# Patient Record
Sex: MALE | Race: WHITE
[De-identification: names, ages, dates, MRNs, and addresses within clinical notes are randomized per-mention and may not be internally consistent; named-entity substitution may affect disease eponyms.]

---

## 2017-04-06 ENCOUNTER — HOSPITAL ENCOUNTER (OUTPATIENT)
Dept: HOSPITAL 75 - ER | Age: 32
Discharge: HOME | End: 2017-04-06
Attending: SURGERY
Payer: COMMERCIAL

## 2017-04-06 VITALS — DIASTOLIC BLOOD PRESSURE: 83 MMHG | SYSTOLIC BLOOD PRESSURE: 134 MMHG

## 2017-04-06 VITALS — SYSTOLIC BLOOD PRESSURE: 107 MMHG | DIASTOLIC BLOOD PRESSURE: 64 MMHG

## 2017-04-06 VITALS — DIASTOLIC BLOOD PRESSURE: 78 MMHG | SYSTOLIC BLOOD PRESSURE: 119 MMHG

## 2017-04-06 VITALS — WEIGHT: 195.31 LBS | HEIGHT: 75 IN | BODY MASS INDEX: 24.28 KG/M2

## 2017-04-06 VITALS — DIASTOLIC BLOOD PRESSURE: 85 MMHG | SYSTOLIC BLOOD PRESSURE: 129 MMHG

## 2017-04-06 DIAGNOSIS — K21.0: Primary | ICD-10-CM

## 2017-04-06 DIAGNOSIS — K29.70: ICD-10-CM

## 2017-04-06 LAB
ALBUMIN SERPL-MCNC: 4.7 G/DL (ref 3.2–4.5)
ALT SERPL-CCNC: 21 U/L (ref 0–55)
ANION GAP SERPL CALC-SCNC: 12 MMOL/L (ref 5–14)
AST SERPL-CCNC: 23 U/L (ref 5–34)
BASOPHILS # BLD AUTO: 0.1 10^3/UL (ref 0–0.1)
BASOPHILS NFR BLD AUTO: 0 % (ref 0–10)
BILIRUB SERPL-MCNC: 1.4 MG/DL (ref 0.1–1)
BUN SERPL-MCNC: 14 MG/DL (ref 7–18)
BUN/CREAT SERPL: 13
CALCIUM SERPL-MCNC: 9.4 MG/DL (ref 8.5–10.1)
CHLORIDE SERPL-SCNC: 109 MMOL/L (ref 98–107)
CO2 SERPL-SCNC: 23 MMOL/L (ref 21–32)
CREAT SERPL-MCNC: 1.05 MG/DL (ref 0.6–1.3)
EOSINOPHIL # BLD AUTO: 0.1 10^3/UL (ref 0–0.3)
EOSINOPHIL NFR BLD AUTO: 1 % (ref 0–10)
ERYTHROCYTE [DISTWIDTH] IN BLOOD BY AUTOMATED COUNT: 12.8 % (ref 10–14.5)
GFR SERPLBLD BASED ON 1.73 SQ M-ARVRAT: > 60 ML/MIN
GLUCOSE SERPL-MCNC: 97 MG/DL (ref 70–105)
LYMPHOCYTES # BLD AUTO: 1.8 X 10^3 (ref 1–4)
LYMPHOCYTES NFR BLD AUTO: 14 % (ref 12–44)
MCH RBC QN AUTO: 32 PG (ref 25–34)
MCHC RBC AUTO-ENTMCNC: 35 G/DL (ref 32–36)
MCV RBC AUTO: 92 FL (ref 80–99)
MONOCYTES # BLD AUTO: 1.2 X 10^3 (ref 0–1)
MONOCYTES NFR BLD AUTO: 9 % (ref 0–12)
NEUTROPHILS # BLD AUTO: 9.7 X 10^3 (ref 1.8–7.8)
NEUTROPHILS NFR BLD AUTO: 76 % (ref 42–75)
PLATELET # BLD: 297 10^3/UL (ref 130–400)
PMV BLD AUTO: 10.1 FL (ref 7.4–10.4)
POTASSIUM SERPL-SCNC: 3.7 MMOL/L (ref 3.6–5)
PROT SERPL-MCNC: 7.5 G/DL (ref 6.4–8.2)
RBC # BLD AUTO: 4.53 10^6/UL (ref 4.35–5.85)
SODIUM SERPL-SCNC: 144 MMOL/L (ref 135–145)
WBC # BLD AUTO: 12.7 10^3/UL (ref 4.3–11)

## 2017-04-06 PROCEDURE — 71020: CPT

## 2017-04-06 PROCEDURE — 96375 TX/PRO/DX INJ NEW DRUG ADDON: CPT

## 2017-04-06 PROCEDURE — 85025 COMPLETE CBC W/AUTO DIFF WBC: CPT

## 2017-04-06 PROCEDURE — 88305 TISSUE EXAM BY PATHOLOGIST: CPT

## 2017-04-06 PROCEDURE — 99211 OFF/OP EST MAY X REQ PHY/QHP: CPT

## 2017-04-06 PROCEDURE — 36415 COLL VENOUS BLD VENIPUNCTURE: CPT

## 2017-04-06 PROCEDURE — 96374 THER/PROPH/DIAG INJ IV PUSH: CPT

## 2017-04-06 PROCEDURE — 80053 COMPREHEN METABOLIC PANEL: CPT

## 2017-04-06 RX ADMIN — LORAZEPAM SCH MG: 2 INJECTION INTRAMUSCULAR; INTRAVENOUS at 13:30

## 2017-04-06 RX ADMIN — MIDAZOLAM HYDROCHLORIDE PRN MG: 1 INJECTION, SOLUTION INTRAMUSCULAR; INTRAVENOUS at 10:25

## 2017-04-06 RX ADMIN — MIDAZOLAM HYDROCHLORIDE PRN MG: 1 INJECTION, SOLUTION INTRAMUSCULAR; INTRAVENOUS at 10:33

## 2017-04-06 RX ADMIN — MIDAZOLAM HYDROCHLORIDE PRN MG: 1 INJECTION, SOLUTION INTRAMUSCULAR; INTRAVENOUS at 10:28

## 2017-04-06 RX ADMIN — LORAZEPAM SCH MG: 2 INJECTION INTRAMUSCULAR; INTRAVENOUS at 05:19

## 2017-04-06 RX ADMIN — DEXTROSE MONOHYDRATE AND SODIUM CHLORIDE SCH MLS/HR: 5; .45 INJECTION, SOLUTION INTRAVENOUS at 02:38

## 2017-04-06 RX ADMIN — METOCLOPRAMIDE SCH MG: 5 INJECTION, SOLUTION INTRAMUSCULAR; INTRAVENOUS at 12:30

## 2017-04-06 RX ADMIN — LORAZEPAM SCH MG: 2 INJECTION INTRAMUSCULAR; INTRAVENOUS at 08:37

## 2017-04-06 RX ADMIN — FENTANYL CITRATE PRN MCG: 50 INJECTION, SOLUTION INTRAMUSCULAR; INTRAVENOUS at 10:30

## 2017-04-06 RX ADMIN — FENTANYL CITRATE PRN MCG: 50 INJECTION, SOLUTION INTRAMUSCULAR; INTRAVENOUS at 10:23

## 2017-04-06 RX ADMIN — MIDAZOLAM HYDROCHLORIDE PRN MG: 1 INJECTION, SOLUTION INTRAMUSCULAR; INTRAVENOUS at 10:35

## 2017-04-06 RX ADMIN — METOCLOPRAMIDE SCH MG: 5 INJECTION, SOLUTION INTRAMUSCULAR; INTRAVENOUS at 08:37

## 2017-04-06 RX ADMIN — DEXTROSE MONOHYDRATE AND SODIUM CHLORIDE SCH MLS/HR: 5; .45 INJECTION, SOLUTION INTRAVENOUS at 03:40

## 2017-04-06 NOTE — CONSCIOUS SEDATION/ASA
Conscious Sedation Pre-Proced


Time Reviewed:  10:00


ASA Class:  2











Airway Mallampati Classification: ([place] appropriate class) I.  II.  III,  IV


 


Lungs 


 


Heart 


 


 ASA score


 


 ASA 1: a normal healthy patient


 


 ASA 2:  a patient with a mild systemic disease (mid diabetes, controlled 

hypertension, obesity 


 


 ASA 3:  a patient with a severe systemic disease that limits activity  (angina

, COPD, prior Myocardial infarction)


 


 ASA 4:  a patient with an incapacitating disease that is a constant threat to 

life (CHF, renal failure)


 


 ASA 5:  a moribund patient not expected to survive 24 hrs.  (ruptured aneurysm)


 


 ASA 6:  a declared brain dead patient whose organs are being harvested.


 


 For emergent operations, add the letter E after the classification








Grade 2


Sedation Plan:  Analgesia, Amnesia, Plan communicated to team members, 

Discussed options with patient/fam, Discussed risks with patient/fam


Note


The patient is an appropriate candidate to undergo the planned procedure, 

sedation, and anesthesia.





The patient immediately re-assessed prior to indication.











PEDRO BLACK MD Apr 6, 2017 10:11 am

## 2017-04-06 NOTE — ED GI
General


Chief Complaint:  Foreign Body


Stated Complaint:  ESOPHAGEAL OBSTRUCTION


Nursing Triage Note:  


PT STATES HAS PIECE OF STEAK CAUGHT IN THROAT FOR APPROX 3 HOURS


Sepsis Screen:  No Definite Risk


Source of Information:  Patient





History of Present Illness


Time Seen By Provider:  00:33


Initial Comments


PT STATES HE WAS EATING STEAK TONIGHT AND IT HAS BECOME STUCK AND NOW CANNOT 

EVEN SWALLOW SALIVA--COMES BACK UP


STATES IT OCCURRED 3 HOURS AGO


HE STATES IT HAS HAPPENED MULTIPLE TIMES OVER THE LAST COUPLE OF YEARS, BUT HAS 

ALWAYS BEEN ABLE TO GET IT UNSTUCK, AND HAS NEVER SOUGHT CARE AT ANY TIME FOR 

THIS PROBLEM


C/O MUCH PRESSURE IN CHEST--"FEELS LIKE A BIG GAS BUBBLE" 


NO CHOKING OR DIFFICULTY BREATHING OR WHEEZING.





PCP: DR. CHAUHAN





Allergies and Home Medications


Allergies


Coded Allergies:  


     sulfamethoxazole (Verified  Allergy, Intermediate, RASH, 6/22/14)


     trimethoprim (Verified  Allergy, Intermediate, RASH, 6/22/14)


     Penicillins (Verified  Allergy, Unknown, HIVES, 6/22/14)





Home Medications


Omeprazole 40 Mg Capsule.dr, 40 MG PO DAILY, (Reported)





Review of Systems


Constitutional:  no symptoms reported


Respiratory:  No Symptoms Reported


Cardiovascular:  See HPI


Gastrointestinal:  See HPI


Genitourinary:  No Symptoms Reported


Musculoskeletal:  no symptoms reported


Skin:  no symptoms reported


Psychiatric/Neurological:  No Symptoms Reported


Endocrine:  No Symptoms Reported


Hematologic/Lymphatic:  No Symptoms Reported





Past Medical-Social-Family Hx


Patient Social History


Alcohol Use:  Denies Use


Recreational Drug Use:  No


Smoking Status:  Never a Smoker


Recent Foreign Travel:  No


Contact w/Someone Who Travel:  No


Recent Infectious Disease Expo:  No


Recent Hopitalizations:  No


Physical Abuse Screen:  No


Sexual Abuse:  No





Immunizations Up To Date


Tetanus Booster (TDap):  Less than 5yrs





Seasonal Allergies


Seasonal Allergies:  No





Surgeries


HX Surgeries:  Yes (RIGHT KNEE/LEG FOR FX WHEN YOUNG)


Surgeries:  Orthopedic





Respiratory


Hx Respiratory Disorders:  No





Cardiovascular


Hx Cardiac Disorders:  No





Neurological


Hx Neurological Disorders:  No





Genitourinary


Hx Genitourinary Disorders:  No





Gastrointestinal


Hx Gastrointestinal Disorders:  Yes (SELF-DX OF GERD; ESOPHAGEAL OBSTRUCTIONS)


Gastrointestinal Disorders:  Gastroesophageal Reflux





Musculoskeletal


Hx Musculoskeletal Disorders:  Yes (RIGHT KNEE/LEG FX AND SURGICAL REPAIR AS 

CHILD)





Endocrine


Hx Endocrine Disorders:  No





HEENT


HX ENT Disorders:  No





Cancer


Hx Cancer:  No





Psychosocial


Hx Psychiatric Problems:  No





Integumentary


HX Skin/Integumentary Disorder:  No





Blood Transfusions


Hx Blood Disorders:  No


Adverse Reaction to a Blood Tr:  No





Physical Exam


Vital Signs





 VS - Last 72 Hours, by Label








 4/6/17





 00:40


 


Temp 98.5


 


Pulse 133


 


Resp 18


 


B/P (MAP) 126/88


 


Pulse Ox 96





Capillary Refill : Less Than 3 Seconds


General Appearance:  WD/WN, no apparent distress, other (TALKS WITHOUT 

DIFFICULTY, BUT SPITTING OUT SALIVA)


Respiratory:  chest non-tender, normal breath sounds, no respiratory distress, 

no accessory muscle use


Cardiovascular:  regular rate, rhythm, no edema, no murmur


Gastrointestinal:  normal bowel sounds, non tender, soft, no organomegaly, no 

pulsatile mass


Extremities:  normal inspection


Back:  normal inspection


Neurologic/Psychiatric:  CNs II-XII nml as tested, no motor/sensory deficits, 

alert, normal mood/affect, oriented x 3


Skin:  normal color, warm/dry





Progress/Results/Core Measures


Results/Orders


My Orders





Orders - KATHI PONCE DO


Chest Pa/Lat (2 View) (4/6/17 00:34)


Saline Lock/Iv-Start (4/6/17 00:37)


Glucagon Emergency Kit (Glucagon Emergen (4/6/17 00:45)


Glucagon Emergency Kit (Glucagon Emergen (4/6/17 00:35)


Glucagon Emergency Kit (Glucagon Emergen (4/6/17 01:00)





Medications Given in ED





Current Medications








 Medications  Dose


 Ordered  Sig/Vicki


 Route  Start Time


 Stop Time Status Last Admin


Dose Admin


 


 Glucagon  1 mg  ONCE  ONCE


 IV  4/6/17 00:45


 4/6/17 00:46 DC 4/6/17 00:45


1 MG


 


 Glucagon  1 mg  ONCE  ONCE


 IV  4/6/17 01:00


 4/6/17 01:01 DC 4/6/17 00:58


1 MG








Vital Signs/I&O





Vital Sign - Last 12Hours








 4/6/17





 00:40


 


Temp 98.5


 


Pulse 133


 


Resp 18


 


B/P (MAP) 126/88


 


Pulse Ox 96














Blood Pressure Mean:  100








Progress Note :  


Progress Note


PT DID VOMIT A LARGE AMOUNT OF LIQUID AND SOME FOOD DEBRIS IN XRAY DEPT, PRIOR 

TO GETTING CXR


PT GIVEN AN ADDITIONAL DOSE OF GLUCAGON, THEN TRIED TO DRINK WATER--WITHOUT 

SUCCESS--BEGAN SPITTING UP SALIVA AND WATER AGAIN





Diagnostic Imaging





Comments


CXR--NO ACUTE PROCESS, PENDING RADIOLOGIST REVIEW


   Reviewed:  Reviewed by Me





Departure


Communication


Progress Notes


0110--SPOKE WITH DR. BLACK, ACCEPTS PT FOR ADMIT. ORDERS NOTED.





Impression


Impression:  


 Primary Impression:  


 Esophageal obstruction due to food impaction


Disposition:  09 ADMITTED AS INPATIENT


Condition:  Stable


Decision to Admit Reason:  Admit from ER (General)


Decision to Admit/Date:  Apr 6, 2017


Time/Decision to Admit Time:  01:10





Departure-Patient Inst.


Referrals:  


OZZY CHAUHAN MD (PCP)


Primary Care Physician











KATHI PONCE DO Apr 6, 2017 05:04

## 2017-04-06 NOTE — HISTORY AND PHYSICAL
DATE OF ADMISSION: 

04/06/2017

 

ATTENDING PRIMARY CARE PHYSICIAN: 

Dr. CRISTINO Steen 

 

Mr. Rufino Alberts is a 31-year-old male who presented late last

night with dysphagia and coughing. He reports he has had this

issue for the past 3 years after eating a meal and certain food

boluses however, this time this has persisted longer. He reports

that he has had a history of heartburn and reflux for several

years now and he states that this is usually precipitated by

alcohol. He has been taking omeprazole 40 mg daily; however,

states that he does forget to take it on some occasions and has

heartburn becomes worse. On this episode he had dinner last night

and felt chest pressure sensation followed by a cough that would

not reduce. He does not report any hematemesis, no coffee-ground

emesis. 

 

PAST MEDICAL HISTORY:

1.   Gastroesophageal reflux disease.

2.   Peptic ulcer disease. 

 

PAST SURGERIES:

Left knee arthroscopy. 

 

ALLERGIES:

PENICILLIN 

 

MEDICATIONS:

Omeprazole 40 mg daily. 

 

SOCIAL HISTORY:

Negative smoke. Social alcohol. 

 

FAMILY HISTORY:

Noncontributory. 

 

VITAL SIGNS:   Stable, afebrile. 

 

REVIEW OF SYSTEMS:

This is a well-nourished male, currently in no acute distress. He

is not experiencing shortness of breath or difficulty breathing.

Mild substernal chest pressure sensation as well as hiccups.

Frequent episodes of regurgitation, however, this has slightly

improved over time. No diarrhea, constipation. No red blood per

rectum. No dark tarry stools. No fever or chills. No recent

inadvertent weight loss. 

 

PHYSICAL EXAMINATION:

CHEST: Clear. 

HEART: Regular. 

EXTREMITIES: No lower extremity edema. Negative Homans sign. 

HEENT: No scleral icterus. No cervical lymphadenopathy. 

ABDOMEN: Soft, nontender, nondistended. 

 

ASSESSMENT AND PLAN:

31-year-old male with dysphagia most likely secondary to reflux

esophagitis and esophageal stricture. We will proceed with an

EGD, as well as biopsies and possible balloon dilatation. 

 

 

 

Job ID: [id number] 

Dictated Date: 04/06/2017 08:06:57 

Transcription Date: 04/06/2017 09:08:40/tobias

## 2017-04-06 NOTE — PROGRESS NOTE-PRE OPERATIVE
Pre-Operative Progress Note


H&P Reviewed


The H&P was reviewed, patient examined and no changes noted.


Date H&P Reviewed:  Apr 6, 2017


Time H&P Reviewed:  10:00


Pre-Operative Diagnosis:  dysphagia, GERD











PEDRO BLACK MD Apr 6, 2017 10:12 am

## 2017-04-06 NOTE — DIAGNOSTIC IMAGING REPORT
Clinical indication: Evaluate chest.



Exam: Chest x-ray PA and lateral views.



Comparisons: None.



Findings:



Lungs/pleura: Lungs are clear. There is no pneumothorax. There

is no pleural effusion.



Mediastinum: Unremarkable.



Pulmonary vasculature: Unremarkable.



Heart: Unremarkable.



Bones/extrathoracic soft tissue: Unremarkable.



Impression:

There is no radiographic evidence of acute cardiopulmonary

process.



Dictated by:



Dictated on workstation # [id number]

## 2017-04-07 NOTE — OPERATIVE REPORT
PROCEDURE PHYSICIAN:   PEDRO BLACK

 

DATE OF PROCEDURE:  

04/06/2017

 

ATTENDING PRIMARY CARE PHYSICIAN: 

Dr. Steen. 

 

PREOPERATIVE DIAGNOSES:

1.   Dysphagia. 

2.   Gastroesophageal reflux disease. 

 

POSTOPERATIVE DIAGNOSES:

1.   Severe reflux esophagitis, between class C and D with some

superficial erosions.

2.   No hiatal hernia. 

3.   Moderate severity gastritis of the stomach, as well as the

pylorus. 

 

PROCEDURE:

EGD with biopsy and balloon dilatation of the distal esophagus. 

 

SURGEON:

Dr. Black. 

 

ANESTHESIA:

Conscious sedation. 

 

ESTIMATED BLOOD LOSS:

Minimal. 

 

FINDINGS:

1.   Severe reflux esophagitis, between class C and D with some

superficial erosions. 

2.   No hiatal hernia. 

3.   Moderate severity gastritis throughout the stomach and

pylorus with no formal ulcers or distal obstructions. 

 

DISPOSITION:

The patient tolerated the procedure well. 

 

BRIEF HISTORY:

Mr. Rufino Alberts is a 31-year-old male who presented early this

morning with dysphagia and coughing. He reports that he has had

similar issues to this in the past 2 years after eating a meal or

certain food boluses however, this time this had persisted

longer. He has a history of heartburn and reflux for several

years and has been taking omeprazole 40 mg daily. He reports that

he does drink alcohol on the weekends, which does make his

symptoms worse. After the previous night's dinner he felt chest

pressure sensation follow by a cough that would not reduce. He

did not report any hematemesis, no coffee-ground emesis. 

 

PROCEDURE:

The patient was brought to the endoscopy suite, laid in the left

lateral decubitus position with the head slightly elevated. After

adequate IV pain and sedative medications and conscious sedation

anesthesia, the mouthpiece was applied. 

 

The endoscope was placed in the mouth, visualizing the pharynx

and hypopharyngeal region. Vocal cords, epiglottis and vallecula

identified and appeared to be normal. The endoscope was then

advanced through the first, second, and 3rd portions of the

esophagus.  At the level of the GE junction a severe reflux

esophagitis, between class C and D was identified. There were

some erosions identified in this region as well. There was also

edema of the mucosal layer. Biopsies were taken with forceps with

visualization of good hemostasis. 

 

The endoscope was then advanced in stomach and endoscope

retroflexed visualizing no hiatal hernia. There was a moderate

severity gastritis throughout the stomach, as well as the

pylorus; however, no formal ulcers, polyps or any neoplasms.  The

duodenum appeared normal with no distal obstructions. A biopsy

was taken of the stomach antrum as well. 

 

We then directed our attention to dilatation of the distal

esophagus.  A CRE fixed guidewire balloon was placed into the

stomach and then retracted back to the area of the edema. The

balloon was dilated to 3 atmospheres of pressure then 4.5 and

then 7 atmospheres of pressure, approximately 18 mm.  There is

mild resistance. This was left in place for approximately 60

seconds. The balloon was then desufflated and removed. No mucosal

tears were identified. The endoscope was then slowly withdrawn

while taking a second look and suctioning of residual air with no

additional findings. 

 

The patient tolerated the procedure well. We will have him

continue with medical management with the necessary lifestyle and

diet accommodation including smaller, more frequent meals,

avoidance of eating at night, as well as head elevation while

lying supine. He also needs to avoid alcohol, caffeinated

beverages, as well as spicy, greasy and acidic foods. We will

also start him on Protonix 40 mg daily, as well as Carafate 1

gram q.i.d. for the next 2 weeks then on a p.r.n. basis. 

 

 

 

Job ID: [id number]

Dictated Date: 04/06/2017 20:29:10 

Transcription Date: 04/07/2017 11:03:46 / tobias

## 2017-05-07 NOTE — XMS REPORT
Continuity of Care Document

 Created on: 2017



RONNIE MILNER

External Reference #: [id number]

: 1985

Sex: Male



Demographics







 Address  [address]

[place]  [address]

 

 Home Phone  [phone]

 

 Preferred Language  Unknown

 

 Marital Status  Unknown

 

 Jewish Affiliation  Unknown

 

 Race  Unknown

 

 Ethnic Group  Unknown





Author







 Author  [facility]

 

 Address  Unknown

 

 Phone  Unavailable



                                                      



Allergies

                      





 Active                    Description                    Code                  
  Type                    Severity                    Reaction                  
  Onset                    Reported/Identified                    Relationship 
to Patient                    Clinical Status                

 

 Yes                    Penicillins                                         
Drug Allergy                    N/A                    N/A                     
                    2013                                                 
         

 

 Yes                    sulfamethoxazole                    [id number]         
           Drug Allergy                    Moderate                    RASH    
                                     2017                                
                          

 

 Yes                    trimethoprim                    [id number]             
       Drug Allergy                    Moderate                    RASH        
                                 2017                                    
                      

 

 Yes                    Penicillins                    [id number]              
      Drug Allergy                    Unknown                    HIVES         
                                2017                                     
                     



                                                                               
                                       



Medications

                                                                               
         



Problems

                      





 Date Dx Coded                    Attending                    Type            
        Code                    Diagnosis                    Diagnosed By      
          

 

 2008                                                              311   
                 MO DEPRESSIVE DISORDER NOS                                     

 

 2008                                                              311   
                 MO DEPRESSIVE DISORDER NOS                                     

 

 2008                                                              311   
                 MO DEPRESSIVE DISORDER NOS                                     

 

 2008                                                              311   
                 MO DEPRESSIVE DISORDER NOS                                     

 

 2008                    SHERRY JAIMES                        
                 311                    MO DEPRESSIVE DISORDER NOS             
                        

 

 2008                    SHERRY JAIMES                        
                 311                    MO DEPRESSIVE DISORDER NOS             
                        

 

 2008                    SHERRY JAIMES                        
                 311                    MO DEPRESSIVE DISORDER NOS             
                        

 

 2008                    SHERRY JAIMES                        
                 311                    MO DEPRESSIVE DISORDER NOS             
                        

 

 2008                    CLEMENTINA ZAVALETA                       
                  311                    MO DEPRESSIVE DISORDER NOS            
                         

 

 2008                    SHAHANA MONDRAGON                         
                311                    MO DEPRESSIVE DISORDER NOS              
                       

 

 2008                                                              300.00
                    AN ANXIETY UNSPEC                                     

 

 2008                                                              300.00
                    AN ANXIETY UNSPEC                                     

 

 2008                                                              300.00
                    AN ANXIETY UNSPEC                                     

 

 2008                                                              300.00
                    AN ANXIETY UNSPEC                                     

 

 2008                    SHERRY JAIMES                        
                 300.00                    AN ANXIETY UNSPEC                   
                  

 

 2008                    SHERRY JAIMES                        
                 300.00                    AN ANXIETY UNSPEC                   
                  

 

 2008                    SHERRY JAIMES                        
                 300.00                    AN ANXIETY UNSPEC                   
                  

 

 2008                    SHERRY JAIMES                        
                 300.00                    AN ANXIETY UNSPEC                   
                  

 

 2008                    CLEMENTINA ZAVALETA                       
                  300.00                    AN ANXIETY UNSPEC                  
                   

 

 2008                    SHAHANA MONDRAGON                         
                300.00                    AN ANXIETY UNSPEC                    
                 

 

 2008                                                              V58.69
                    MEDICATION HIGH RISK                                     

 

 2008                                                              V58.69
                    MEDICATION HIGH RISK                                     

 

 2008                                                              V58.69
                    MEDICATION HIGH RISK                                     

 

 2008                                                              V58.69
                    MEDICATION HIGH RISK                                     

 

 2008                    SHERRY JAIMES                        
                 V58.69                    MEDICATION HIGH RISK                
                     

 

 2008                    SHERRY JAIMES                        
                 V58.69                    MEDICATION HIGH RISK                
                     

 

 2008                    SHERRY JAIMES                        
                 V58.69                    MEDICATION HIGH RISK                
                     

 

 2008                    SHERRY JAIMES                        
                 V58.69                    MEDICATION HIGH RISK                
                     

 

 2008                    CLEMENTINA ZAVALETA                       
                  V58.69                    MEDICATION HIGH RISK               
                      

 

 2008                    SHAHANA MONDRAGON                         
                V58.69                    MEDICATION HIGH RISK                 
                    

 

 2013                                                              314.01
                    ADHD COMBINED                                     

 

 2013                                                              314.01
                    ADHD COMBINED                                     

 

 2013                                                              314.01
                    ADHD COMBINED                                     

 

 2013                                                              314.01
                    ADHD COMBINED                                     

 

 2013                    SHERRY JAIMES                        
                 314.01                    ADHD COMBINED                       
              

 

 2013                    SHERRY JAIMES                        
                 314.01                    ADHD COMBINED                       
              

 

 2013                    SHERRY JAIMES                        
                 314.01                    ADHD COMBINED                       
              

 

 2013                    SHERRY JAIMES                        
                 314.01                    ADHD COMBINED                       
              

 

 2013                    CLEMENTINA ZAVALETA R                       
                  314.01                    ADHD COMBINED                      
               

 

 2013                    SHAHANA MONDRAGON                         
                314.01                    ADHD COMBINED                        
             

 

 2014                    CLEMENTINA ZAVALETA R                       
                  300.23                    AN SOCIAL PHOBIA                   
                  

 

 2014                    CLEMENTINA ZAVALETA R                       
                  305.00                    NONDEPENDENT ALCOHOL ABUSE 
UNSPECIFIED DRINKING BEHAVIOR                                     

 

 2014                    CLEMENTINA ZAVALETA R                       
                  461.9                    SINUSITIS ACUTE                     
                

 

 2014                    SHAHANA MONDRAGON                         
                300.23                    AN SOCIAL PHOBIA                     
                

 

 2014                    SHAHANA MONDRAGON                         
                305.00                    NONDEPENDENT ALCOHOL ABUSE 
UNSPECIFIED DRINKING BEHAVIOR                                     

 

 2014                    SHAHANA MONDRAGON                         
                461.9                    SINUSITIS ACUTE                       
              

 

 2014                    ROMY FREDERICK, ALINA ELMORE                    Ot  
                  708.9                    URTICARIA NOS                       
              

 

 2017                    PEDRO BLACK MD, Ot          
          K21.0                    GASTRO-ESOPHAGEAL REFLUX DISEASE WITH ES    
                                 

 

 2017                    PEDRO BLACK MD, Ot          
          K29.70                    GASTRITIS, UNSPECIFIED, WITHOUT BLEEDING   
                                  

 

 2017                    PEDRO BLACK MD, Ot          
          K21.0                    GASTRO-ESOPHAGEAL REFLUX DISEASE WITH ES    
                                 

 

 2017                    PEDRO BLACK MD, Ot          
          K29.70                    GASTRITIS, UNSPECIFIED, WITHOUT BLEEDING   
                                  



                                                                               
                                                                               
                                                                               
                                                                               
                                                                               
                                                                               
                                                                               
                                   



Procedures

                      





 Code                    Description                    Performed By           
         Performed On                

 

                     25030                                                     
     URINE DRUG SCREEN  (IN-HOUSE)                                             
              2013                

 

                     68447                                                     
     PSYCH DIAG EVAL W/MED SRVCS                                               
            2013                

 

                     38598                                                     
     URINE DRUG SCREEN  (IN-HOUSE)                                             
              2013                

 

                     85080                                                     
     URINE DRUG SCREEN  (IN-HOUSE)                                             
              2013                

 

                     21923                                                     
     URINE DRUG SCREEN  (IN-HOUSE)                                             
              10/03/2013                

 

                     23665                                                     
     DRUG CONFIRMATION                                                         
  10/06/2013                

 

                     [id number]                                                     
     STREP A  (IN-HOUSE)                                                       
    2014                



                                                                               
                                                                     



Results

                      





 Test                    Result                    Range                









 Complete blood count (CBC) with automated white blood cell (WBC) differential 
- 17 06:20                









 Blood leukocytes automated count (number/volume)                    12.7 10*3/
uL                    4.3-11.0                

 

 Blood erythrocytes automated count (number/volume)                    4.53 10*6
/uL                    4.35-5.85                

 

 Venous blood hemoglobin measurement (mass/volume)                    14.6 g/dL
                    13.3-17.7                

 

 Blood hematocrit (volume fraction)                    42 %                    
40-54                

 

 Automated erythrocyte mean corpuscular volume                    92 [foz_us]  
                  80-99                

 

 Automated erythrocyte mean corpuscular hemoglobin (mass per erythrocyte)      
              32 pg                    25-34                

 

 Automated erythrocyte mean corpuscular hemoglobin concentration measurement (
mass/volume)                    35 g/dL                    32-36                

 

 Automated erythrocyte distribution width ratio                    12.8 %      
              10.0-14.5                

 

 Automated blood platelet count (count/volume)                    297 10*3/uL  
                  130-400                

 

 Automated blood platelet mean volume measurement                    10.1 [foz_
us]                    7.4-10.4                

 

 Automated blood neutrophils/100 leukocytes                    76 %            
        42-75                

 

 Automated blood lymphocytes/100 leukocytes                    14 %            
        12-44                

 

 Blood monocytes/100 leukocytes                    9 %                    0-12 
               

 

 Automated blood eosinophils/100 leukocytes                    1 %             
       0-10                

 

 Automated blood basophils/100 leukocytes                    0 %               
     0-10                

 

 Blood neutrophils automated count (number/volume)                    9.7 10*3 
                   1.8-7.8                

 

 Blood lymphocytes automated count (number/volume)                    1.8 10*3 
                   1.0-4.0                

 

 Blood monocytes automated count (number/volume)                    1.2 10*3   
                 0.0-1.0                

 

 Automated eosinophil count                    0.1 10*3/uL                    
0.0-0.3                

 

 Automated blood basophil count (count/volume)                    0.1 10*3/uL  
                  0.0-0.1                









 Comprehensive metabolic panel - 17 06:20                









 Serum or plasma sodium measurement (moles/volume)                    144 mmol/
L                    135-145                

 

 Serum or plasma potassium measurement (moles/volume)                    3.7 
mmol/L                    3.6-5.0                

 

 Serum or plasma chloride measurement (moles/volume)                    109 mmol
/L                                    

 

 Carbon dioxide                    23 mmol/L                    21-32          
      

 

 Serum or plasma anion gap determination (moles/volume)                    12 
mmol/L                    5-14                

 

 Serum or plasma urea nitrogen measurement (mass/volume)                    14 
mg/dL                    7-18                

 

 Serum or plasma creatinine measurement (mass/volume)                    1.05 mg
/dL                    0.60-1.30                

 

 Serum or plasma urea nitrogen/creatinine mass ratio                    13     
                NRG                

 

 Serum or plasma creatinine measurement with calculation of estimated 
glomerular filtration rate                    >                     NRG        
        

 

 Serum or plasma glucose measurement (mass/volume)                    97 mg/dL 
                                   

 

 Serum or plasma calcium measurement (mass/volume)                    9.4 mg/dL
                    8.5-10.1                

 

 Serum or plasma total bilirubin measurement (mass/volume)                    
1.4 mg/dL                    0.1-1.0                

 

 Serum or plasma alkaline phosphatase measurement (enzymatic activity/volume)  
                  70 U/L                                    

 

 Serum or plasma aspartate aminotransferase measurement (enzymatic activity/
volume)                    23 U/L                    5-34                

 

 Serum or plasma alanine aminotransferase measurement (enzymatic activity/volume
)                    21 U/L                    0-55                

 

 Serum or plasma protein measurement (mass/volume)                    7.5 g/dL 
                   6.4-8.2                

 

 Serum or plasma albumin measurement (mass/volume)                    4.7 g/dL 
                   3.2-4.5                



                                                                               
         



Encounters

                      





 ACCT No.                    Visit Date/Time                    Discharge      
              Status                    Pt. Type                    Provider   
                 Facility                    Loc./Unit                    
Complaint                

 

 [id number]                    2014 13:18:00                    2014 23:
59:59                    CLS                    Outpatient                    
CLEMENTINA ZAVALETA                                                      
                         

 

 [id number]                    2014 11:48:00                    2014 23:
59:59                    CLS                    Outpatient                    
SHAHANA MONDRAGON                                                        
                       

 

 [id number]                    2014 12:41:00                    2014 23:
59:59                    CLS                    Outpatient                    
SHERRY JAIMES                                                       
                        

 

 [id number]                    10/03/2013 16:23:00                    10/03/2013 23:
59:59                    CLS                    Outpatient                    
SHERRY JAIMES                                                       
                        

 

 [id number]                    2013 09:44:00                    2013 23:
59:59                    CLS                    Outpatient                    
SHERRY JAIMES                                                       
                        

 

 [id number]                    2013 09:44:00                    2013 23:
59:59                    CLS                    Outpatient                    
SHERRY JAIMES                                                       
                        

 

 [id number]                    2013 13:36:00                                 
                             Document Registration                             
                                                                       

 

 [id number]                    2013 13:36:00                                 
                             Document Registration                             
                                                                       

 

 [id number]                    2013 10:49:00                                 
                             Document Registration                             
                                                                       

 

 [id number]                    2013 13:55:00                                 
                             Document Registration

## 2017-05-07 NOTE — XMS REPORT
Continuity of Care Document

 Created on: 2017



RONNIE MILNER

External Reference #: [id number]

: 1985

Sex: Male



Demographics







 Address  [address]

[place]  [address]

 

 Home Phone  [phone]

 

 Preferred Language  Unknown

 

 Marital Status  Unknown

 

 [employer] Affiliation  Unknown

 

 Race  Unknown

 

 Ethnic Group  Unknown





Author







 Author  [facility]

 

 Address  Unknown

 

 Phone  Unavailable



                                                      



Allergies

                      





 Active                    Description                    Code                  
  Type                    Severity                    Reaction                  
  Onset                    Reported/Identified                    Relationship 
to Patient                    Clinical Status                

 

 Yes                    Penicillins                                         
Drug Allergy                    N/A                    N/A                     
                    2013                                                 
         

 

 Yes                    sulfamethoxazole                    [id number]         
           Drug Allergy                    Moderate                    RASH    
                                     2017                                
                          

 

 Yes                    trimethoprim                    [id number]             
       Drug Allergy                    Moderate                    RASH        
                                 2017                                    
                      

 

 Yes                    Penicillins                    [id number]              
      Drug Allergy                    Unknown                    HIVES         
                                2017                                     
                     



                                                                               
                                       



Medications

                                                                               
         



Problems

                      





 Date Dx Coded                    Attending                    Type            
        Code                    Diagnosis                    Diagnosed By      
          

 

 2008                                                              311   
                 MO DEPRESSIVE DISORDER NOS                                     

 

 2008                                                              311   
                 MO DEPRESSIVE DISORDER NOS                                     

 

 2008                                                              311   
                 MO DEPRESSIVE DISORDER NOS                                     

 

 2008                                                              311   
                 MO DEPRESSIVE DISORDER NOS                                     

 

 2008                    SHERRY JAIMES                        
                 311                    MO DEPRESSIVE DISORDER NOS             
                        

 

 2008                    SHERRY JAIMES                        
                 311                    MO DEPRESSIVE DISORDER NOS             
                        

 

 2008                    SHERRY JAIMES                        
                 311                    MO DEPRESSIVE DISORDER NOS             
                        

 

 2008                    SHERRY JAIMES                        
                 311                    MO DEPRESSIVE DISORDER NOS             
                        

 

 2008                    CLEMENTINA ZAVALETA                       
                  311                    MO DEPRESSIVE DISORDER NOS            
                         

 

 2008                    SHAHANA MONDRAGON                         
                311                    MO DEPRESSIVE DISORDER NOS              
                       

 

 2008                                                              300.00
                    AN ANXIETY UNSPEC                                     

 

 2008                                                              300.00
                    AN ANXIETY UNSPEC                                     

 

 2008                                                              300.00
                    AN ANXIETY UNSPEC                                     

 

 2008                                                              300.00
                    AN ANXIETY UNSPEC                                     

 

 2008                    SHERRY JAIMES                        
                 300.00                    AN ANXIETY UNSPEC                   
                  

 

 2008                    SHERRY JAIMES                        
                 300.00                    AN ANXIETY UNSPEC                   
                  

 

 2008                    SHERRY JAIMES                        
                 300.00                    AN ANXIETY UNSPEC                   
                  

 

 2008                    SHERRY JAIMES                        
                 300.00                    AN ANXIETY UNSPEC                   
                  

 

 2008                    CLEMENTINA ZAVALETA                       
                  300.00                    AN ANXIETY UNSPEC                  
                   

 

 2008                    SHAHANA MONDRAGON                         
                300.00                    AN ANXIETY UNSPEC                    
                 

 

 2008                                                              V58.69
                    MEDICATION HIGH RISK                                     

 

 2008                                                              V58.69
                    MEDICATION HIGH RISK                                     

 

 2008                                                              V58.69
                    MEDICATION HIGH RISK                                     

 

 2008                                                              V58.69
                    MEDICATION HIGH RISK                                     

 

 2008                    SHERRY JAIMES                        
                 V58.69                    MEDICATION HIGH RISK                
                     

 

 2008                    SHERRY JAIMES                        
                 V58.69                    MEDICATION HIGH RISK                
                     

 

 2008                    SHERRY JAIMES                        
                 V58.69                    MEDICATION HIGH RISK                
                     

 

 2008                    SHERRY JAIMES                        
                 V58.69                    MEDICATION HIGH RISK                
                     

 

 2008                    CLEMENTINA ZAVALETA                       
                  V58.69                    MEDICATION HIGH RISK               
                      

 

 2008                    SHAHANA MONDRAGON                         
                V58.69                    MEDICATION HIGH RISK                 
                    

 

 2013                                                              314.01
                    ADHD COMBINED                                     

 

 2013                                                              314.01
                    ADHD COMBINED                                     

 

 2013                                                              314.01
                    ADHD COMBINED                                     

 

 2013                                                              314.01
                    ADHD COMBINED                                     

 

 2013                    SHERRY JAIMES                        
                 314.01                    ADHD COMBINED                       
              

 

 2013                    SHERRY JAIMES                        
                 314.01                    ADHD COMBINED                       
              

 

 2013                    SHERRY JAIMES                        
                 314.01                    ADHD COMBINED                       
              

 

 2013                    SHERRY JAIMES                        
                 314.01                    ADHD COMBINED                       
              

 

 2013                    CLEMENTINA ZAVALETA R                       
                  314.01                    ADHD COMBINED                      
               

 

 2013                    SHAHANA MONDRAGON                         
                314.01                    ADHD COMBINED                        
             

 

 2014                    CLEMENTINA ZAVALETA R                       
                  300.23                    AN SOCIAL PHOBIA                   
                  

 

 2014                    CLEMENTINA ZAVALETA R                       
                  305.00                    NONDEPENDENT ALCOHOL ABUSE 
UNSPECIFIED DRINKING BEHAVIOR                                     

 

 2014                    CLEMENTINA ZAVALETA R                       
                  461.9                    SINUSITIS ACUTE                     
                

 

 2014                    SHAHANA MONDRAGON                         
                300.23                    AN SOCIAL PHOBIA                     
                

 

 2014                    SHAHANA MONDRAGON                         
                305.00                    NONDEPENDENT ALCOHOL ABUSE 
UNSPECIFIED DRINKING BEHAVIOR                                     

 

 2014                    SHAHANA MONDRAGON                         
                461.9                    SINUSITIS ACUTE                       
              

 

 2014                    ROMY FREDERICK, ALINA ELMORE                    Ot  
                  708.9                    URTICARIA NOS                       
              

 

 2017                    PEDRO BLACK MD, Ot          
          K21.0                    GASTRO-ESOPHAGEAL REFLUX DISEASE WITH ES    
                                 

 

 2017                    PEDRO BLACK MD, Ot          
          K29.70                    GASTRITIS, UNSPECIFIED, WITHOUT BLEEDING   
                                  

 

 2017                    PEDRO BLACK MD, Ot          
          K21.0                    GASTRO-ESOPHAGEAL REFLUX DISEASE WITH ES    
                                 

 

 2017                    PEDRO BLACK MD, Ot          
          K29.70                    GASTRITIS, UNSPECIFIED, WITHOUT BLEEDING   
                                  



                                                                               
                                                                               
                                                                               
                                                                               
                                                                               
                                                                               
                                                                               
                                   



Procedures

                      





 Code                    Description                    Performed By           
         Performed On                

 

                     69547                                                     
     URINE DRUG SCREEN  (IN-HOUSE)                                             
              2013                

 

                     [id number]                                                     
     PSYCH DIAG EVAL W/MED SRVCS                                               
            2013                

 

                     65928                                                     
     URINE DRUG SCREEN  (IN-HOUSE)                                             
              2013                

 

                     31413                                                     
     URINE DRUG SCREEN  (IN-HOUSE)                                             
              2013                

 

                     [id number]                                                     
     URINE DRUG SCREEN  (IN-HOUSE)                                             
              10/03/2013                

 

                     [id number]                                                     
     DRUG CONFIRMATION                                                         
  10/06/2013                

 

                     [id number]                                                     
     STREP A  (IN-HOUSE)                                                       
    2014                



                                                                               
                                                                     



Results

                      





 Test                    Result                    Range                









 Complete blood count (CBC) with automated white blood cell (WBC) differential 
- 17 06:20                









 Blood leukocytes automated count (number/volume)                    12.7 10*3/
uL                    4.3-11.0                

 

 Blood erythrocytes automated count (number/volume)                    4.53 10*6
/uL                    4.35-5.85                

 

 Venous blood hemoglobin measurement (mass/volume)                    14.6 g/dL
                    13.3-17.7                

 

 Blood hematocrit (volume fraction)                    42 %                    
40-54                

 

 Automated erythrocyte mean corpuscular volume                    92 [foz_us]  
                  80-99                

 

 Automated erythrocyte mean corpuscular hemoglobin (mass per erythrocyte)      
              32 pg                    25-34                

 

 Automated erythrocyte mean corpuscular hemoglobin concentration measurement (
mass/volume)                    35 g/dL                    32-36                

 

 Automated erythrocyte distribution width ratio                    12.8 %      
              10.0-14.5                

 

 Automated blood platelet count (count/volume)                    297 10*3/uL  
                  130-400                

 

 Automated blood platelet mean volume measurement                    10.1 [foz_
us]                    7.4-10.4                

 

 Automated blood neutrophils/100 leukocytes                    76 %            
        42-75                

 

 Automated blood lymphocytes/100 leukocytes                    14 %            
        12-44                

 

 Blood monocytes/100 leukocytes                    9 %                    0-12 
               

 

 Automated blood eosinophils/100 leukocytes                    1 %             
       0-10                

 

 Automated blood basophils/100 leukocytes                    0 %               
     0-10                

 

 Blood neutrophils automated count (number/volume)                    9.7 10*3 
                   1.8-7.8                

 

 Blood lymphocytes automated count (number/volume)                    1.8 10*3 
                   1.0-4.0                

 

 Blood monocytes automated count (number/volume)                    1.2 10*3   
                 0.0-1.0                

 

 Automated eosinophil count                    0.1 10*3/uL                    
0.0-0.3                

 

 Automated blood basophil count (count/volume)                    0.1 10*3/uL  
                  0.0-0.1                









 Comprehensive metabolic panel - 17 06:20                









 Serum or plasma sodium measurement (moles/volume)                    144 mmol/
L                    135-145                

 

 Serum or plasma potassium measurement (moles/volume)                    3.7 
mmol/L                    3.6-5.0                

 

 Serum or plasma chloride measurement (moles/volume)                    109 mmol
/L                                    

 

 Carbon dioxide                    23 mmol/L                    21-32          
      

 

 Serum or plasma anion gap determination (moles/volume)                    12 
mmol/L                    5-14                

 

 Serum or plasma urea nitrogen measurement (mass/volume)                    14 
mg/dL                    7-18                

 

 Serum or plasma creatinine measurement (mass/volume)                    1.05 mg
/dL                    0.60-1.30                

 

 Serum or plasma urea nitrogen/creatinine mass ratio                    13     
                NRG                

 

 Serum or plasma creatinine measurement with calculation of estimated 
glomerular filtration rate                    >                     NRG        
        

 

 Serum or plasma glucose measurement (mass/volume)                    97 mg/dL 
                                   

 

 Serum or plasma calcium measurement (mass/volume)                    9.4 mg/dL
                    8.5-10.1                

 

 Serum or plasma total bilirubin measurement (mass/volume)                    
1.4 mg/dL                    0.1-1.0                

 

 Serum or plasma alkaline phosphatase measurement (enzymatic activity/volume)  
                  70 U/L                                    

 

 Serum or plasma aspartate aminotransferase measurement (enzymatic activity/
volume)                    23 U/L                    5-34                

 

 Serum or plasma alanine aminotransferase measurement (enzymatic activity/volume
)                    21 U/L                    0-55                

 

 Serum or plasma protein measurement (mass/volume)                    7.5 g/dL 
                   6.4-8.2                

 

 Serum or plasma albumin measurement (mass/volume)                    4.7 g/dL 
                   3.2-4.5                



                                                                               
         



Encounters

                      





 ACCT No.                    Visit Date/Time                    Discharge      
              Status                    Pt. Type                    Provider   
                 Facility                    Loc./Unit                    
Complaint                

 

 [id number]                    2014 13:18:00                    2014 23:
59:59                    CLS                    Outpatient                    
CLEMENTINA ZAVALETA                                                      
                         

 

 [id number]                    2014 11:48:00                    2014 23:
59:59                    CLS                    Outpatient                    
SHAHANA MONDRAGON                                                        
                       

 

 [id number]                    2014 12:41:00                    2014 23:
59:59                    CLS                    Outpatient                    
SHERRY JAIMES                                                       
                        

 

 [id number]                    10/03/2013 16:23:00                    10/03/2013 23:
59:59                    CLS                    Outpatient                    
SHERRY JAIMES                                                       
                        

 

 [id number]                    2013 09:44:00                    2013 23:
59:59                    CLS                    Outpatient                    
SHERRY JAIMES                                                       
                        

 

 [id number]                    2013 09:44:00                    2013 23:
59:59                    CLS                    Outpatient                    
SHERRY JAIMES                                                       
                        

 

 [id number]                    2013 13:36:00                                 
                             Document Registration                             
                                                                       

 

 [id number]                    2013 13:36:00                                 
                             Document Registration                             
                                                                       

 

 [id number]                    2013 10:49:00                                 
                             Document Registration                             
                                                                       

 

 [id number]                    2013 13:55:00                                 
                             Document Registration

## 2017-05-07 NOTE — XMS REPORT
Continuity of Care Document

 Created on: 2017



RONNIE MILNER

External Reference #: [id number]

: 1985

Sex: Male



Demographics







 Address  [address]

[place]  [address]

 

 Home Phone  [phone]

 

 Preferred Language  Unknown

 

 Marital Status  Unknown

 

 Zoroastrian Affiliation  Unknown

 

 Race  Unknown

 

 Ethnic Group  Unknown





Author







 Author  [facility]

 

 Address  Unknown

 

 Phone  Unavailable



                                                      



Allergies

                      





 Active                    Description                    Code                  
  Type                    Severity                    Reaction                  
  Onset                    Reported/Identified                    Relationship 
to Patient                    Clinical Status                

 

 Yes                    Penicillins                                         
Drug Allergy                    N/A                    N/A                     
                    2013                                                 
         

 

 Yes                    sulfamethoxazole                    [id number]         
           Drug Allergy                    Moderate                    RASH    
                                     2017                                
                          

 

 Yes                    trimethoprim                    [id number]             
       Drug Allergy                    Moderate                    RASH        
                                 2017                                    
                      

 

 Yes                    Penicillins                    [id number]              
      Drug Allergy                    Unknown                    HIVES         
                                2017                                     
                     



                                                                               
                                       



Medications

                                                                               
         



Problems

                      





 Date Dx Coded                    Attending                    Type            
        Code                    Diagnosis                    Diagnosed By      
          

 

 2008                                                              311   
                 MO DEPRESSIVE DISORDER NOS                                     

 

 2008                                                              311   
                 MO DEPRESSIVE DISORDER NOS                                     

 

 2008                                                              311   
                 MO DEPRESSIVE DISORDER NOS                                     

 

 2008                                                              311   
                 MO DEPRESSIVE DISORDER NOS                                     

 

 2008                    SHERRY JAIMES                        
                 311                    MO DEPRESSIVE DISORDER NOS             
                        

 

 2008                    SHERRY JAIMES                        
                 311                    MO DEPRESSIVE DISORDER NOS             
                        

 

 2008                    SHERRY JAIMES                        
                 311                    MO DEPRESSIVE DISORDER NOS             
                        

 

 2008                    SHERRY JAIMES                        
                 311                    MO DEPRESSIVE DISORDER NOS             
                        

 

 2008                    CLEMENTINA ZAVALETA                       
                  311                    MO DEPRESSIVE DISORDER NOS            
                         

 

 2008                    SHAHANA MONDRAGON                         
                311                    MO DEPRESSIVE DISORDER NOS              
                       

 

 2008                                                              300.00
                    AN ANXIETY UNSPEC                                     

 

 2008                                                              300.00
                    AN ANXIETY UNSPEC                                     

 

 2008                                                              300.00
                    AN ANXIETY UNSPEC                                     

 

 2008                                                              300.00
                    AN ANXIETY UNSPEC                                     

 

 2008                    SHERRY JAIMES                        
                 300.00                    AN ANXIETY UNSPEC                   
                  

 

 2008                    SHERRY JAIMES                        
                 300.00                    AN ANXIETY UNSPEC                   
                  

 

 2008                    SHERRY JAIMES                        
                 300.00                    AN ANXIETY UNSPEC                   
                  

 

 2008                    SHERRY JAIMES                        
                 300.00                    AN ANXIETY UNSPEC                   
                  

 

 2008                    CLEMENTINA ZAVALETA                       
                  300.00                    AN ANXIETY UNSPEC                  
                   

 

 2008                    SHAHANA MONDRAGON                         
                300.00                    AN ANXIETY UNSPEC                    
                 

 

 2008                                                              V58.69
                    MEDICATION HIGH RISK                                     

 

 2008                                                              V58.69
                    MEDICATION HIGH RISK                                     

 

 2008                                                              V58.69
                    MEDICATION HIGH RISK                                     

 

 2008                                                              V58.69
                    MEDICATION HIGH RISK                                     

 

 2008                    SHERRY JAIMES                        
                 V58.69                    MEDICATION HIGH RISK                
                     

 

 2008                    SHERRY JAIMES                        
                 V58.69                    MEDICATION HIGH RISK                
                     

 

 2008                    SHERRY JAIMES                        
                 V58.69                    MEDICATION HIGH RISK                
                     

 

 2008                    SHERRY JAIMES                        
                 V58.69                    MEDICATION HIGH RISK                
                     

 

 2008                    CLEMENTINA ZAVALETA                       
                  V58.69                    MEDICATION HIGH RISK               
                      

 

 2008                    SHAHANA MONDRAGON                         
                V58.69                    MEDICATION HIGH RISK                 
                    

 

 2013                                                              314.01
                    ADHD COMBINED                                     

 

 2013                                                              314.01
                    ADHD COMBINED                                     

 

 2013                                                              314.01
                    ADHD COMBINED                                     

 

 2013                                                              314.01
                    ADHD COMBINED                                     

 

 2013                    SHERRY JAIMES                        
                 314.01                    ADHD COMBINED                       
              

 

 2013                    SHERRY JAIMES                        
                 314.01                    ADHD COMBINED                       
              

 

 2013                    SHERRY JAIMES                        
                 314.01                    ADHD COMBINED                       
              

 

 2013                    SHERRY JAIMES                        
                 314.01                    ADHD COMBINED                       
              

 

 2013                    CLEMENTINA ZAVALETA R                       
                  314.01                    ADHD COMBINED                      
               

 

 2013                    SHAHANA MONDRAGON                         
                314.01                    ADHD COMBINED                        
             

 

 2014                    CLEMENTINA ZAVALETA R                       
                  300.23                    AN SOCIAL PHOBIA                   
                  

 

 2014                    CLEMENTINA ZAVALETA R                       
                  305.00                    NONDEPENDENT ALCOHOL ABUSE 
UNSPECIFIED DRINKING BEHAVIOR                                     

 

 2014                    CLEMENTINA ZAVALETA R                       
                  461.9                    SINUSITIS ACUTE                     
                

 

 2014                    SHAHANA MONDRAGON                         
                300.23                    AN SOCIAL PHOBIA                     
                

 

 2014                    SHAHANA MONDRAGON                         
                305.00                    NONDEPENDENT ALCOHOL ABUSE 
UNSPECIFIED DRINKING BEHAVIOR                                     

 

 2014                    SHAHANA MONDRAGON                         
                461.9                    SINUSITIS ACUTE                       
              

 

 2014                    ROMY FREDERICK, ALINA ELMORE                    Ot  
                  708.9                    URTICARIA NOS                       
              

 

 2017                    PEDRO BLACK MD, Ot          
          K21.0                    GASTRO-ESOPHAGEAL REFLUX DISEASE WITH ES    
                                 

 

 2017                    PEDRO BLACK MD, Ot          
          K29.70                    GASTRITIS, UNSPECIFIED, WITHOUT BLEEDING   
                                  

 

 2017                    PEDRO BLACK MD, Ot          
          K21.0                    GASTRO-ESOPHAGEAL REFLUX DISEASE WITH ES    
                                 

 

 2017                    PEDRO BLACK MD, Ot          
          K29.70                    GASTRITIS, UNSPECIFIED, WITHOUT BLEEDING   
                                  



                                                                               
                                                                               
                                                                               
                                                                               
                                                                               
                                                                               
                                                                               
                                   



Procedures

                      





 Code                    Description                    Performed By           
         Performed On                

 

                     [id number]                                                     
     URINE DRUG SCREEN  (IN-HOUSE)                                             
              2013                

 

                     [id number]                                                     
     PSYCH DIAG EVAL W/MED SRVCS                                               
            2013                

 

                     [id number]                                                     
     URINE DRUG SCREEN  (IN-HOUSE)                                             
              2013                

 

                     [id number]                                                     
     URINE DRUG SCREEN  (IN-HOUSE)                                             
              2013                

 

                     [id number]                                                     
     URINE DRUG SCREEN  (IN-HOUSE)                                             
              10/03/2013                

 

                     [id number]                                                     
     DRUG CONFIRMATION                                                         
  10/06/2013                

 

                     [id number]                                                     
     STREP A  (IN-HOUSE)                                                       
    2014                



                                                                               
                                                                     



Results

                      





 Test                    Result                    Range                









 Complete blood count (CBC) with automated white blood cell (WBC) differential 
- 17 06:20                









 Blood leukocytes automated count (number/volume)                    12.7 10*3/
uL                    4.3-11.0                

 

 Blood erythrocytes automated count (number/volume)                    4.53 10*6
/uL                    4.35-5.85                

 

 Venous blood hemoglobin measurement (mass/volume)                    14.6 g/dL
                    13.3-17.7                

 

 Blood hematocrit (volume fraction)                    42 %                    
40-54                

 

 Automated erythrocyte mean corpuscular volume                    92 [foz_us]  
                  80-99                

 

 Automated erythrocyte mean corpuscular hemoglobin (mass per erythrocyte)      
              32 pg                    25-34                

 

 Automated erythrocyte mean corpuscular hemoglobin concentration measurement (
mass/volume)                    35 g/dL                    32-36                

 

 Automated erythrocyte distribution width ratio                    12.8 %      
              10.0-14.5                

 

 Automated blood platelet count (count/volume)                    297 10*3/uL  
                  130-400                

 

 Automated blood platelet mean volume measurement                    10.1 [foz_
us]                    7.4-10.4                

 

 Automated blood neutrophils/100 leukocytes                    76 %            
        42-75                

 

 Automated blood lymphocytes/100 leukocytes                    14 %            
        12-44                

 

 Blood monocytes/100 leukocytes                    9 %                    0-12 
               

 

 Automated blood eosinophils/100 leukocytes                    1 %             
       0-10                

 

 Automated blood basophils/100 leukocytes                    0 %               
     0-10                

 

 Blood neutrophils automated count (number/volume)                    9.7 10*3 
                   1.8-7.8                

 

 Blood lymphocytes automated count (number/volume)                    1.8 10*3 
                   1.0-4.0                

 

 Blood monocytes automated count (number/volume)                    1.2 10*3   
                 0.0-1.0                

 

 Automated eosinophil count                    0.1 10*3/uL                    
0.0-0.3                

 

 Automated blood basophil count (count/volume)                    0.1 10*3/uL  
                  0.0-0.1                









 Comprehensive metabolic panel - 17 06:20                









 Serum or plasma sodium measurement (moles/volume)                    144 mmol/
L                    135-145                

 

 Serum or plasma potassium measurement (moles/volume)                    3.7 
mmol/L                    3.6-5.0                

 

 Serum or plasma chloride measurement (moles/volume)                    109 mmol
/L                                    

 

 Carbon dioxide                    23 mmol/L                    21-32          
      

 

 Serum or plasma anion gap determination (moles/volume)                    12 
mmol/L                    5-14                

 

 Serum or plasma urea nitrogen measurement (mass/volume)                    14 
mg/dL                    7-18                

 

 Serum or plasma creatinine measurement (mass/volume)                    1.05 mg
/dL                    0.60-1.30                

 

 Serum or plasma urea nitrogen/creatinine mass ratio                    13     
                NRG                

 

 Serum or plasma creatinine measurement with calculation of estimated 
glomerular filtration rate                    >                     NRG        
        

 

 Serum or plasma glucose measurement (mass/volume)                    97 mg/dL 
                                   

 

 Serum or plasma calcium measurement (mass/volume)                    9.4 mg/dL
                    8.5-10.1                

 

 Serum or plasma total bilirubin measurement (mass/volume)                    
1.4 mg/dL                    0.1-1.0                

 

 Serum or plasma alkaline phosphatase measurement (enzymatic activity/volume)  
                  70 U/L                                    

 

 Serum or plasma aspartate aminotransferase measurement (enzymatic activity/
volume)                    23 U/L                    5-34                

 

 Serum or plasma alanine aminotransferase measurement (enzymatic activity/volume
)                    21 U/L                    0-55                

 

 Serum or plasma protein measurement (mass/volume)                    7.5 g/dL 
                   6.4-8.2                

 

 Serum or plasma albumin measurement (mass/volume)                    4.7 g/dL 
                   3.2-4.5                



                                                                               
         



Encounters

                      





 ACCT No.                    Visit Date/Time                    Discharge      
              Status                    Pt. Type                    Provider   
                 Facility                    Loc./Unit                    
Complaint                

 

 [id number]                    2014 13:18:00                    2014 23:
59:59                    CLS                    Outpatient                    
CLEMENTINA ZAVALETA                                                      
                         

 

 [id number]                    2014 11:48:00                    2014 23:
59:59                    CLS                    Outpatient                    
SHAHANA MONDRAGON                                                        
                       

 

 [id number]                    2014 12:41:00                    2014 23:
59:59                    CLS                    Outpatient                    
SHERRY JAIMES                                                       
                        

 

 [id number]                    10/03/2013 16:23:00                    10/03/2013 23:
59:59                    CLS                    Outpatient                    
SHERRY JAIMES                                                       
                        

 

 [id number]                    2013 09:44:00                    2013 23:
59:59                    CLS                    Outpatient                    
SHERRY JAIMES                                                       
                        

 

 [id number]                    2013 09:44:00                    2013 23:
59:59                    CLS                    Outpatient                    
SHERRY JAIMES                                                       
                        

 

 [id number]                    2013 13:36:00                                 
                             Document Registration                             
                                                                       

 

 [id number]                    2013 13:36:00                                 
                             Document Registration                             
                                                                       

 

 [id number]                    2013 10:49:00                                 
                             Document Registration                             
                                                                       

 

 [id number]                    2013 13:55:00                                 
                             Document Registration

## 2019-03-25 ENCOUNTER — HOSPITAL ENCOUNTER (EMERGENCY)
Dept: HOSPITAL 75 - ER | Age: 34
Discharge: ANOTHER HOSPITAL | End: 2019-03-25
Payer: SELF-PAY

## 2019-03-25 VITALS — HEIGHT: 70 IN | BODY MASS INDEX: 20.04 KG/M2 | WEIGHT: 140 LBS

## 2019-03-25 VITALS — DIASTOLIC BLOOD PRESSURE: 80 MMHG | SYSTOLIC BLOOD PRESSURE: 135 MMHG

## 2019-03-25 DIAGNOSIS — N39.0: ICD-10-CM

## 2019-03-25 DIAGNOSIS — Z88.2: ICD-10-CM

## 2019-03-25 DIAGNOSIS — Z88.8: ICD-10-CM

## 2019-03-25 DIAGNOSIS — F22: Primary | ICD-10-CM

## 2019-03-25 DIAGNOSIS — R44.1: ICD-10-CM

## 2019-03-25 DIAGNOSIS — F12.10: ICD-10-CM

## 2019-03-25 DIAGNOSIS — G47.30: ICD-10-CM

## 2019-03-25 DIAGNOSIS — Z88.0: ICD-10-CM

## 2019-03-25 DIAGNOSIS — K21.9: ICD-10-CM

## 2019-03-25 DIAGNOSIS — R44.0: ICD-10-CM

## 2019-03-25 DIAGNOSIS — Z87.891: ICD-10-CM

## 2019-03-25 DIAGNOSIS — F10.20: ICD-10-CM

## 2019-03-25 LAB
ALBUMIN SERPL-MCNC: 5.1 GM/DL (ref 3.2–4.5)
ALP SERPL-CCNC: 75 U/L (ref 40–136)
ALT SERPL-CCNC: 55 U/L (ref 0–55)
APAP SERPL-MCNC: < 10 UG/ML (ref 10–30)
APTT PPP: YELLOW S
BACTERIA #/AREA URNS HPF: (no result) /HPF
BARBITURATES UR QL: NEGATIVE
BASOPHILS # BLD AUTO: 0.1 10^3/UL (ref 0–0.1)
BASOPHILS NFR BLD AUTO: 1 % (ref 0–10)
BENZODIAZ UR QL SCN: NEGATIVE
BILIRUB SERPL-MCNC: 3.2 MG/DL (ref 0.1–1)
BILIRUB UR QL STRIP: (no result)
BUN/CREAT SERPL: 16
CALCIUM SERPL-MCNC: 10.2 MG/DL (ref 8.5–10.1)
CHLORIDE SERPL-SCNC: 102 MMOL/L (ref 98–107)
CO2 SERPL-SCNC: 22 MMOL/L (ref 21–32)
COCAINE UR QL: NEGATIVE
CREAT SERPL-MCNC: 1.02 MG/DL (ref 0.6–1.3)
EOSINOPHIL # BLD AUTO: 0.1 10^3/UL (ref 0–0.3)
EOSINOPHIL NFR BLD AUTO: 1 % (ref 0–10)
ERYTHROCYTE [DISTWIDTH] IN BLOOD BY AUTOMATED COUNT: 12.6 % (ref 10–14.5)
FIBRINOGEN PPP-MCNC: CLEAR MG/DL
GFR SERPLBLD BASED ON 1.73 SQ M-ARVRAT: > 60 ML/MIN
GLUCOSE SERPL-MCNC: 121 MG/DL (ref 70–105)
GLUCOSE UR STRIP-MCNC: NEGATIVE MG/DL
HCT VFR BLD CALC: 44 % (ref 40–54)
HGB BLD-MCNC: 15.3 G/DL (ref 13.3–17.7)
KETONES UR QL STRIP: (no result)
LEUKOCYTE ESTERASE UR QL STRIP: (no result)
LIPASE SERPL-CCNC: 13 U/L (ref 8–78)
LYMPHOCYTES # BLD AUTO: 1.2 X 10^3 (ref 1–4)
LYMPHOCYTES NFR BLD AUTO: 12 % (ref 12–44)
MANUAL DIFFERENTIAL PERFORMED BLD QL: NO
MCH RBC QN AUTO: 32 PG (ref 25–34)
MCHC RBC AUTO-ENTMCNC: 35 G/DL (ref 32–36)
MCV RBC AUTO: 92 FL (ref 80–99)
METHADONE UR QL SCN: NEGATIVE
METHAMPHETAMINE SCREEN URINE S: NEGATIVE
MONOCYTES # BLD AUTO: 1.3 X 10^3 (ref 0–1)
MONOCYTES NFR BLD AUTO: 13 % (ref 0–12)
NEUTROPHILS # BLD AUTO: 7.7 X 10^3 (ref 1.8–7.8)
NEUTROPHILS NFR BLD AUTO: 74 % (ref 42–75)
NITRITE UR QL STRIP: NEGATIVE
OPIATES UR QL SCN: NEGATIVE
OXYCODONE UR QL: NEGATIVE
PH UR STRIP: 5 [PH] (ref 5–9)
PLATELET # BLD: 296 10^3/UL (ref 130–400)
PMV BLD AUTO: 9.8 FL (ref 7.4–10.4)
POTASSIUM SERPL-SCNC: 3.9 MMOL/L (ref 3.6–5)
PROPOXYPH UR QL: NEGATIVE
PROT SERPL-MCNC: 8 GM/DL (ref 6.4–8.2)
PROT UR QL STRIP: (no result)
RBC #/AREA URNS HPF: (no result) /HPF
SALICYLATES SERPL-MCNC: < 5 MG/DL (ref 5–20)
SODIUM SERPL-SCNC: 139 MMOL/L (ref 135–145)
SP GR UR STRIP: 1.02 (ref 1.02–1.02)
SQUAMOUS #/AREA URNS HPF: (no result) /HPF
TRICYCLICS UR QL SCN: NEGATIVE
UROBILINOGEN UR-MCNC: 1 MG/DL
WBC # BLD AUTO: 10.3 10^3/UL (ref 4.3–11)
WBC #/AREA URNS HPF: (no result) /HPF

## 2019-03-25 PROCEDURE — 36415 COLL VENOUS BLD VENIPUNCTURE: CPT

## 2019-03-25 PROCEDURE — 80320 DRUG SCREEN QUANTALCOHOLS: CPT

## 2019-03-25 PROCEDURE — 84443 ASSAY THYROID STIM HORMONE: CPT

## 2019-03-25 PROCEDURE — 80306 DRUG TEST PRSMV INSTRMNT: CPT

## 2019-03-25 PROCEDURE — 81000 URINALYSIS NONAUTO W/SCOPE: CPT

## 2019-03-25 PROCEDURE — 87088 URINE BACTERIA CULTURE: CPT

## 2019-03-25 PROCEDURE — 93005 ELECTROCARDIOGRAM TRACING: CPT

## 2019-03-25 PROCEDURE — 71045 X-RAY EXAM CHEST 1 VIEW: CPT

## 2019-03-25 PROCEDURE — 85025 COMPLETE CBC W/AUTO DIFF WBC: CPT

## 2019-03-25 PROCEDURE — 83690 ASSAY OF LIPASE: CPT

## 2019-03-25 PROCEDURE — 80329 ANALGESICS NON-OPIOID 1 OR 2: CPT

## 2019-03-25 PROCEDURE — 80053 COMPREHEN METABOLIC PANEL: CPT

## 2019-03-25 NOTE — ED PSYCHOSOCIAL
General


Chief Complaint:  Psych/Social Disorder


Stated Complaint:  PARANOID, HEARING VOICES


Source:  patient, family (Dad)


Exam Limitations:  no limitations





History of Present Illness


Date Seen by Provider:  Mar 25, 2019


Time Seen by Provider:  13:04


Initial Comments


Patient presents to ER by private conveyance with his father and chief 

complaint that for the past 10 days she's had progressively worsening episode 

of paranoia, audiovisual hallucinations. He is hearing voices and has a feeling 

that someone is following him and someone is trying to poison him and is afraid 

that all of this is going to end up on the news at night . He denies a history 

of schizophrenia or recreational drug use with the exception of about one week 

ago he used marijuana. He is a intermittent marijuana user. He's not a smoker 

but he does drink a lot of alcohol he says anywhere from 6 beers upwards of 

several fifths of vodka or whiskey every degrees hands on. Is not currently 

employed and treat his depression with alcohol. He says he has not drank in the 

past 3 days because he was having severe decompensation of his delusions, 

paranoia and other symptoms and called his dad's was dad came and picked him up 

and brought him home and since she's been with his dad on Saturday night, 3 

days ago he has been off alcohol. He's never had DTs or withdrawal tremors/

seizures. He's never been inpatient psychiatric hospitalized or inpatient 

alcohol and drug addiction treatment. His dad suspects that the paranoia and 

delusions are secondary to the alcohol because she's been having it for a long 

time and just in the past several months is gotten worse. He has no history of 

being diagnosed with schizophrenia. He does not routinely follow with a primary 

care doctor but he did see Dr. Steen once for his acid reflux. He's had an EGD 

by Dr. Layton which was unremarkable. He's been having some burning sensation in 

his chest and so he's been using antacids with good relief. He denies any other 

significant medical history and other than the Nexium and occasional antacid 

tablet he does not use a routine medicines. He's also been experiencing 

insomnia for the past several days. Dad remarks there is no family history of 

psychosis or schizophrenia.


He says for the past several months he's been using alcohol to deal with his 

depression and suicidal ideation but he's had no suicidal intention. When asked 

if he has a plan he says he plans to get better and does not want to kill 

himself. No previous history of suicide attempt or inpatient psychiatric 

hospitalization.





Allergies and Home Medications


Allergies


Coded Allergies:  


     sulfamethoxazole (Verified  Allergy, Intermediate, RASH, 14)


     trimethoprim (Verified  Allergy, Intermediate, RASH, 14)


     Penicillins (Verified  Allergy, Unknown, HIVES, 14)





Home Medications


Cephalexin 500 Mg Tablet, 500 MG PO BID


   Prescribed by: VANGIE SCHWARTZ on 3/25/19 1557


Multivitamin 1 Each Tablet, 1 TAB PO DAILY, (Reported)


Pantoprazole Sodium 40 Mg Tablet.dr, 40 MG PO DAILY


   Prescribed by: BREEZY HERNÁNDEZ on 17 1403


Permethrin 60 Gm Cream..g., 60 GM TP ONCE


   Apply from the neck down to the toes at night then wear pajamas to bed. 

Bathe in the morning. Repeat in 2 weeks. 


   Prescribed by: VANGIE SCHWARTZ on 3/25/19 1507


Sucralfate 1 Gm Tablet, 1 GM PO QID


   Prescribed by: BREEZY HERNÁNDEZ on 17 1403





Patient Home Medication List


Home Medication List Reviewed:  Yes





Review of Systems


Constitutional:  No chills, No fever


EENTM:  No ear discharge, No ear pain


Respiratory:  No cough, No dyspnea on exertion, No hemoptysis, No orthopnea, No 

phlegm; short of breath (mild); No wheezing


Cardiovascular:  No chest pain, No edema


Gastrointestinal:  see HPI (GERD); No abdominal pain, No constipation, No 

diarrhea, No dysphagia


Genitourinary:  No discharge, No dysuria


Musculoskeletal:  No back pain, No joint pain


Skin:  rash (itchy red nodules on his hands dorsally for the past several months

)





Past Medical-Social-Family Hx


Patient Social History


Alcohol Use:  Regular Use


Alcohol Beverage of Choice:  Beer, Cheap Liquor, Vodka


Recreational Drug Use:  Yes


Drug of Choice:  marijuana


Smoking Status:  Former Smoker


Type Used:  Cigarettes


Recent Foreign Travel:  No


Contact w/Someone Who Travel:  No


Recent Hopitalizations:  No





Immunizations Up To Date


Tetanus Booster (TDap):  Less than 5yrs





Seasonal Allergies


Seasonal Allergies:  No





Past Medical History


Surgeries:  No


Orthopedic


Respiratory:  No


Cardiac:  No


Neurological:  No


Genitourinary:  No


Gastrointestinal:  Yes (Esophogeal problems)


Gastroesophageal Reflux


Musculoskeletal:  No


Endocrine:  No


HEENT:  No


Cancer:  No


Psychosocial:  No


Integumentary:  No


Blood Disorders:  No


Adverse Reaction/Blood Tranf:  No





Physical Exam





Vital Signs - First Documented








 3/25/19





 13:15


 


Temp 98.5


 


Pulse 123


 


Resp 18


 


B/P (MAP) 159/81 (107)


 


Pulse Ox 97


 


O2 Delivery Room Air





Capillary Refill :


Height, Weight, BMI


Height: 6'3.00"


Weight: 195lbs. 5.0oz. [id number]; 24.4 BMI


Method:Stated


General Appearance:  WD/WN, mild distress


HEENT:  PERRL/EOMI, normal ENT inspection, pharynx normal


Neck:  non-tender, full range of motion, supple, normal inspection


Respiratory:  chest non-tender, lungs clear, normal breath sounds, no 

respiratory distress, no accessory muscle use


Cardiovascular:  normal peripheral pulses, regular rate, rhythm, no edema, no 

murmur


Peripheral Pulses:  2+ Radial Pulses (R), 2+ Radial Pulses (L)


Gastrointestinal:  non tender, soft


Extremities:  normal range of motion, normal capillary refill


Neurologic/Psychiatric:  alert, oriented x 3, other (anxious affect)


Appearance/Memory:  no memory impairment, disheveled


Behavior/Eye Contact:  cooperative, avoids eye contact, decreased rate of speech

, other (soft, quiet)


Thoughts/Hallucinations:  auditory hallucinations, delusions, paranoid, 

persecution


Skin:  rash (erythematous, pruritic papules on the dorsum and between the 

fingers of the bilateral hands)





Progress/Results/Core Measures


Results/Orders


Lab Results





Laboratory Tests








Test


 3/25/19


13:19 3/25/19


13:25 Range/Units


 


 


Urine Color YELLOW    


 


Urine Clarity CLEAR    


 


Urine pH 5   5-9  


 


Urine Specific Gravity 1.025 H  1.016-1.022  


 


Urine Protein 2+ H  NEGATIVE  


 


Urine Glucose (UA) NEGATIVE   NEGATIVE  


 


Urine Ketones 3+ H  NEGATIVE  


 


Urine Nitrite NEGATIVE   NEGATIVE  


 


Urine Bilirubin 1+ H  NEGATIVE  


 


Urine Urobilinogen 1   NORMAL  MG/DL


 


Urine Leukocyte Esterase 1+ H  NEGATIVE  


 


Urine RBC (Auto) 2+ H  NEGATIVE  


 


Urine RBC RARE    /HPF


 


Urine WBC 5-10 H   /HPF


 


Urine Squamous Epithelial


Cells RARE 


 


  /HPF





 


Urine Crystals NONE    /LPF


 


Urine Bacteria FEW H   /HPF


 


Urine Casts NONE    /LPF


 


Urine Mucus MODERATE H   /LPF


 


Urine Culture Indicated YES    


 


Urine Opiates Screen NEGATIVE   NEGATIVE  


 


Urine Oxycodone Screen NEGATIVE   NEGATIVE  


 


Urine Methadone Screen NEGATIVE   NEGATIVE  


 


Urine Propoxyphene Screen NEGATIVE   NEGATIVE  


 


Urine Barbiturates Screen NEGATIVE   NEGATIVE  


 


Ur Tricyclic Antidepressants


Screen NEGATIVE 


 


 NEGATIVE  





 


Urine Phencyclidine Screen NEGATIVE   NEGATIVE  


 


Urine Amphetamines Screen NEGATIVE   NEGATIVE  


 


Urine Methamphetamines Screen NEGATIVE   NEGATIVE  


 


Urine Benzodiazepines Screen NEGATIVE   NEGATIVE  


 


Urine Cocaine Screen NEGATIVE   NEGATIVE  


 


Urine Cannabinoids Screen POSITIVE H  NEGATIVE  


 


White Blood Count


 


 10.3 


 4.3-11.0


10^3/uL


 


Red Blood Count


 


 4.78 


 4.35-5.85


10^6/uL


 


Hemoglobin  15.3  13.3-17.7  G/DL


 


Hematocrit  44  40-54  %


 


Mean Corpuscular Volume  92  80-99  FL


 


Mean Corpuscular Hemoglobin  32  25-34  PG


 


Mean Corpuscular Hemoglobin


Concent 


 35 


 32-36  G/DL





 


Red Cell Distribution Width  12.6  10.0-14.5  %


 


Platelet Count


 


 296 


 130-400


10^3/uL


 


Mean Platelet Volume  9.8  7.4-10.4  FL


 


Neutrophils (%) (Auto)  74  42-75  %


 


Lymphocytes (%) (Auto)  12  12-44  %


 


Monocytes (%) (Auto)  13 H 0-12  %


 


Eosinophils (%) (Auto)  1  0-10  %


 


Basophils (%) (Auto)  1  0-10  %


 


Neutrophils # (Auto)  7.7  1.8-7.8  X 10^3


 


Lymphocytes # (Auto)  1.2  1.0-4.0  X 10^3


 


Monocytes # (Auto)  1.3 H 0.0-1.0  X 10^3


 


Eosinophils # (Auto)


 


 0.1 


 0.0-0.3


10^3/uL


 


Basophils # (Auto)


 


 0.1 


 0.0-0.1


10^3/uL


 


Sodium Level  139  135-145  MMOL/L


 


Potassium Level  3.9  3.6-5.0  MMOL/L


 


Chloride Level  102    MMOL/L


 


Carbon Dioxide Level  22  21-32  MMOL/L


 


Anion Gap  15 H 5-14  MMOL/L


 


Blood Urea Nitrogen  16  7-18  MG/DL


 


Creatinine


 


 1.02 


 0.60-1.30


MG/DL


 


Estimat Glomerular Filtration


Rate 


 > 60 


  





 


BUN/Creatinine Ratio  16   


 


Glucose Level  121 H   MG/DL


 


Calcium Level  10.2 H 8.5-10.1  MG/DL


 


Corrected Calcium    8.5-10.1  MG/DL


 


Total Bilirubin  3.2 H 0.1-1.0  MG/DL


 


Aspartate Amino Transf


(AST/SGOT) 


 140 H


 5-34  U/L





 


Alanine Aminotransferase


(ALT/SGPT) 


 55 


 0-55  U/L





 


Alkaline Phosphatase  75    U/L


 


Total Protein  8.0  6.4-8.2  GM/DL


 


Albumin  5.1 H 3.2-4.5  GM/DL


 


Lipase  13  8-78  U/L


 


TSH Roosevelt Testing


 


 2.72 


 0.35-4.94


UIU/ML


 


Salicylates Level  < 5.0 L 5.0-20.0  MG/DL


 


Acetaminophen Level  < 10 L 10-30  UG/ML


 


Serum Alcohol  < 10  <10  MG/DL








My Orders





Orders - VANGIE SCHWARTZ


Ua Culture If Indicated (3/25/19 13:22)


Cbc With Automated Diff (3/25/19 13:22)


Comprehensive Metabolic Panel (3/25/19 13:22)


Alcohol (3/25/19 13:22)


Drug Screen Stat (Urine) (3/25/19 13:22)


Acetaminophen (3/25/19 13:22)


Salicylate (3/25/19 13:22)


Ekg Tracing (3/25/19 13:22)


Thyroid Analyzer (3/25/19 13:22)


Bh Status Checks/Observation Q15M (3/25/19 13:22)


Lorazepam Tablet (Ativan Tablet) (3/25/19 13:22)


Lipase (3/25/19 13:22)


Chest 1 View, Ap/Pa Only (3/25/19 13:39)


Folic Acid Tablet (Folic Acid Tablet) (3/25/19 13:45)


Thiamine Tablet (Vitamin B-1 Tablet) (3/25/19 13:45)


Urine Culture (3/25/19 13:19)


Cephalexin Capsule (Keflex Capsule) (3/25/19 14:00)


General/Regular (3/25/19 Lunch)


Permethrin 5% Cream (Elimite 5% Cream) (3/25/19 16:00)





Medications Given in ED





Current Medications








 Medications  Dose


 Ordered  Sig/Vicki


 Route  Start Time


 Stop Time Status Last Admin


Dose Admin


 


 Cephalexin HCl  500 mg  ONCE  ONCE


 PO  3/25/19 14:00


 3/25/19 14:01 DC 3/25/19 14:30


500 MG


 


 Folic Acid  1 mg  ONCE  ONCE


 PO  3/25/19 13:45


 3/25/19 13:46 DC 3/25/19 14:01


1 MG


 


 Permethrin  60 gm  ONCE  ONCE


 TOP  3/25/19 16:00


 3/25/19 16:01 DC 3/25/19 16:19


60 GM


 


 Thiamine HCl  100 mg  ONCE  ONCE


 PO  3/25/19 13:45


 3/25/19 13:46 DC 3/25/19 14:01


100 MG








Vital Signs/I&O











 3/25/19 3/25/19





 13:15 17:30


 


Temp 98.5 98.5


 


Pulse 123 105


 


Resp 18 18


 


B/P (MAP) 159/81 (107) 135/80 (98)


 


Pulse Ox 97 97


 


O2 Delivery Room Air 











Progress


Progress Note #1:  


   Time:  13:36


Progress Note


The patient's had depression and suicidal thoughts but he is not apparently 

presently suicidal. However I think he could possibly benefit from inpatient 

psychiatric hospitalization and the patient agrees with this. Return to get a 

psychiatric workup to include a lipase, chest x-ray and EKG. We'll give him 

some folate and thiamine by mouth. He does not appear particularly dehydrated 

but we will offer him some water to drink. I suspect paranoid schizophrenia 

unless there is some drug in his drug screen to explain his symptoms.


The rash on the back of his hand would be consistent with scabies and we will 

make sure he has some permethrin prescribed outpatient.


We'll get a chest x-ray to workup his shortness of breath but his lung sounds 

and normal vital signs are normal and I suspect is more just referring to the 

sensation of GERD. We've offered him something for the acid reflux but he says 

is not bothering him right now nor shortness of breath presently. He says he 

has a headache and took some Tylenol 3 hours prior to arrival and does not want 

anything else for his headache at this time.


Progress Note #2:  


   Time:  14:28


Progress Note


Patient does endorse some mild dysuria so we will start him on a regimen of 

Keflex. We've offered him a meal. Waiting on the rest of his labs to discuss 

appropriate placement inpatient.





Diagnostic Imaging





   Diagonstic Imaging:  Xray


   Plain Films/CT/US/NM/MRI:  chest (1v)


Comments


No acute cardiopulmonary processes noted.


NAME:   RONNIE MILNER REC#:   [id number]


ACCOUNT#:   [id number]


PT STATUS:   REG ER


:   1985


PHYSICIAN:   VANGIE SCHWARTZ MD


ADMIT DATE:   19/ER


 ***Draft***


Date of Exam:19





CHEST 1 VIEW, AP/PA ONLY








INDICATION: Psychiatric disorder.





Upright portable AP view of the chest is obtained with comparison


made to study of 2017.





FINDINGS: Heart size and pulmonary vascularity are within normal


limits, and the lungs are clear, bilaterally.  





IMPRESSION: Unremarkable chest. 








  Dictated on workstation # [id number]








Dict:   19 [id number]


Trans:   19 1443


[facility] [id number]





Interpreted by:     MANNIE GARVEY MD


Electronically signed by:


   Reviewed:  Reviewed by Me





Departure


Impression





 Primary Impression:  


 Psychosis


 Qualified Codes:  F29 - Unspecified psychosis not due to a substance or known 

physiological condition


 Additional Impressions:  


 Hallucinations


 Paranoid ideation


 Passive suicidal ideations


 Alcoholism /alcohol abuse


 GERD (gastroesophageal reflux disease)


 Qualified Codes:  K21.0 - Gastro-esophageal reflux disease with esophagitis


 UTI (urinary tract infection)


 Qualified Codes:  N30.00 - Acute cystitis without hematuria


Disposition:   XFER SHT-TRM HOSP


Condition:  Stable





Transfer


Time Spoke to Accepting Phy:  15:30


Transfer Progress Notes


1430: Called [facility] [place] and they are full with several 

patients waiting in the ER.


1430: Called [facility] [place], [place] and left a message.


1435: Called Carol at [place] [place] [facility] and will fax over 

information for them to review with Dr. Puente.


1530: [place] [place] [facility] unit called back and Dr. Puente has 

accepted the patient to room 3251. They have a patient still in the room that 

will be discharging at 1730 so we can send the patient then. The father of the 

patient has agreed to transport the patient and is going home to get toiletries 

and clothes. The patient has eaten a meal and is not hungry right now just 

wants to sleep. The 1 mg of Ativan has him resting comfortably with a lights 

off.


Transfer Time:  17:30


Transfer Facility:  


[place] [place] [facility] inpatient psych.


Method of Transfer:  Private Vehicle (father)





Departure-Patient Inst.


Referrals:  


OZZY STEEN MD (PCP/Family)


Primary Care Physician


Scripts


Cephalexin (Cephalexin) 500 Mg Tablet


500 MG PO BID for 7 Days, #13 TAB 0 Refills


   Prov: VANGIE SCHWARTZ         3/25/19 


Permethrin (Permethrin) 60 Gm Cream..g.


60 GM TP ONCE for 14 Days, #2 TUBE 0 Refills


   Apply from the neck down to the toes at night then wear pajamas to


   bed. Bathe in the morning. Repeat in 2 weeks.


   Prov: VANGIE SCHWARTZ         3/25/19











VANGIE SCHWARTZ Mar 25, 2019 13:32

## 2019-03-25 NOTE — XMS REPORT
Continuity of Care Document

 Created on: 2019



RONNIE MILNER

External Reference #: [id number]

: 1985

Sex: Male



Demographics







 Address  [address]

[place]  [address]

 

 Home Phone  [phone] x

 

 Preferred Language  Unknown

 

 Marital Status  Unknown

 

 Gnosticism Affiliation  Unknown

 

 Race  Unknown

 

 Ethnic Group  Unknown





Author







 Author  [facility]

 

 Address  Unknown

 

 Phone  Unavailable



              



Allergies

      





 Active            Description            Code            Type            
Severity            Reaction            Onset            Reported/Identified   
         Relationship to Patient            Clinical Status        

 

 Yes            Penicillins                         Drug Allergy            N/A
            N/A                         2013                             
     

 

 Yes            sulfamethoxazole            [id number]            Drug Allergy 
           Moderate            RASH                         2017         
                         

 

 Yes            trimethoprim            [id number]            Drug Allergy     
       Moderate            RASH                         2017             
                     

 

 Yes            Penicillins            [id number]            Drug Allergy      
      Unknown            HIVES                         2017              
                    



                        



Medications

      



There is no data.                  



Problems

      





 Date Dx Coded            Attending            Type            Code            
Diagnosis            Diagnosed By        

 

 2008                                      311            MO DEPRESSIVE 
DISORDER NOS                     

 

 2008                                      311            MO DEPRESSIVE 
DISORDER NOS                     

 

 2008                                      311            MO DEPRESSIVE 
DISORDER NOS                     

 

 2008                                      311            MO DEPRESSIVE 
DISORDER NOS                     

 

 2008            SHERRY JAIMES                         311    
        MO DEPRESSIVE DISORDER NOS                     

 

 2008            SHERRY JAIMES                         311    
        MO DEPRESSIVE DISORDER NOS                     

 

 2008            SHERRY JAIMES                         311    
        MO DEPRESSIVE DISORDER NOS                     

 

 2008            SHERRY JAIMES                         311    
        MO DEPRESSIVE DISORDER NOS                     

 

 2008            CLEMENTINA ZAVALETA                         311   
         MO DEPRESSIVE DISORDER NOS                     

 

 2008            SHAHANA MONDRAGON                         311     
       MO DEPRESSIVE DISORDER NOS                     

 

 2008                                      300.00            AN ANXIETY 
UNSPEC                     

 

 2008                                      300.00            AN ANXIETY 
UNSPEC                     

 

 2008                                      300.00            AN ANXIETY 
UNSPEC                     

 

 2008                                      300.00            AN ANXIETY 
UNSPEC                     

 

 2008            SHERRY JAIMES                         300.00 
           AN ANXIETY UNSPEC                     

 

 2008            SHERRY JAIMES                         300.00 
           AN ANXIETY UNSPEC                     

 

 2008            SHERRY JAIMES                         300.00 
           AN ANXIETY UNSPEC                     

 

 2008            SHERRY JAIMES                         300.00 
           AN ANXIETY UNSPEC                     

 

 2008            CLEMENTINA ZAVALETA                         300.00
            AN ANXIETY UNSPEC                     

 

 2008            SHAHANA MONDRAGON                         300.00  
          AN ANXIETY UNSPEC                     

 

 2008                                      V58.69            MEDICATION 
HIGH RISK                     

 

 2008                                      V58.69            MEDICATION 
HIGH RISK                     

 

 2008                                      V58.69            MEDICATION 
HIGH RISK                     

 

 2008                                      V58.69            MEDICATION 
HIGH RISK                     

 

 2008            SHERRY JAIMES                         V58.69 
           MEDICATION HIGH RISK                     

 

 2008            SHERRY JAIMES                         V58.69 
           MEDICATION HIGH RISK                     

 

 2008            SHERRY JAIMES                         V58.69 
           MEDICATION HIGH RISK                     

 

 2008            SHERRY JAIMES                         V58.69 
           MEDICATION HIGH RISK                     

 

 2008            CLEMENTINA ZAVALETA                         V58.69
            MEDICATION HIGH RISK                     

 

 2008            SHAHANA MONDRAGON                         V58.69  
          MEDICATION HIGH RISK                     

 

 2013                                      314.01            ADHD 
COMBINED                     

 

 2013                                      314.01            ADHD 
COMBINED                     

 

 2013                                      314.01            ADHD 
COMBINED                     

 

 2013                                      314.01            ADHD 
COMBINED                     

 

 2013            SHERRY JAIMES                         314.01 
           ADHD COMBINED                     

 

 2013            SHERRY JAIMES                         314.01 
           ADHD COMBINED                     

 

 2013            SHERRY JAIMES                         314.01 
           ADHD COMBINED                     

 

 2013            SHERRY JAIMES                         314.01 
           ADHD COMBINED                     

 

 2013            CLEMENTINA ZAVALETA R                         314.01
            ADHD COMBINED                     

 

 2013            SHAHANA MONDRAGON                         314.01  
          ADHD COMBINED                     

 

 2014            CLEMENTINA ZAVALETA R                         300.23
            AN SOCIAL PHOBIA                     

 

 2014            CLEMENTINA ZAVALETA R                         305.00
            NONDEPENDENT ALCOHOL ABUSE UNSPECIFIED DRINKING BEHAVIOR           
          

 

 2014            CLEMENTINA ZAVALETA R                         461.9 
           SINUSITIS ACUTE                     

 

 2014            SHAHANA MONDRAGON                         300.23  
          AN SOCIAL PHOBIA                     

 

 2014            SHAHANA MONDRAGON                         305.00  
          NONDEPENDENT ALCOHOL ABUSE UNSPECIFIED DRINKING BEHAVIOR             
        

 

 2014            SHAHANA MONDRAGON                         461.9   
         SINUSITIS ACUTE                     

 

 2014            ROMY FREDERICK, ALINA ELMORE            Ot            708.9 
           URTICARIA NOS                     

 

 2017            PEDRO BLACK MD, Ot            K21.0         
   GASTRO-ESOPHAGEAL REFLUX DISEASE WITH ES                     

 

 2017            PEDRO BLACK MD, Ot            K29.70        
    GASTRITIS, UNSPECIFIED, WITHOUT BLEEDING                     

 

 2017            PEDRO BLACK MD, Ot            K21.0         
   GASTRO-ESOPHAGEAL REFLUX DISEASE WITH ES                     

 

 2017            PEDRO BLACK MD, Ot            K29.70        
    GASTRITIS, UNSPECIFIED, WITHOUT BLEEDING                     



                                                                               
                                       



Procedures

      





 Code            Description            Performed By            Performed On   
     

 

             70691                                  URINE DRUG SCREEN  (IN-HOUSE
)                                   2013        

 

             86689                                  PSYCH DIAG EVAL W/MED SRVCS
                                   2013        

 

             08395                                  URINE DRUG SCREEN  (IN-HOUSE
)                                   2013        

 

             28610                                  URINE DRUG SCREEN  (IN-HOUSE
)                                   2013        

 

             40828                                  URINE DRUG SCREEN  (IN-HOUSE
)                                   10/03/2013        

 

             39551                                  DRUG CONFIRMATION          
                         10/06/2013        

 

             33210                                  STREP A  (IN-HOUSE)        
                           2014        



                              



Results

      





 Test            Result            Range        









 Complete blood count (CBC) with automated white blood cell (WBC) differential 
- 17 06:20         









 Blood leukocytes automated count (number/volume)            12.7 10*3/uL      
      4.3-11.0        

 

 Blood erythrocytes automated count (number/volume)            4.53 10*6/uL    
        4.35-5.85        

 

 Venous blood hemoglobin measurement (mass/volume)            14.6 g/dL        
    13.3-17.7        

 

 Blood hematocrit (volume fraction)            42 %            40-54        

 

 Automated erythrocyte mean corpuscular volume            92 [foz_us]          
  80-99        

 

 Automated erythrocyte mean corpuscular hemoglobin (mass per erythrocyte)      
      32 pg            25-34        

 

 Automated erythrocyte mean corpuscular hemoglobin concentration measurement (
mass/volume)            35 g/dL            32-36        

 

 Automated erythrocyte distribution width ratio            12.8 %            
10.0-14.5        

 

 Automated blood platelet count (count/volume)            297 10*3/uL          
  130-400        

 

 Automated blood platelet mean volume measurement            10.1 [foz_us]     
       7.4-10.4        

 

 Automated blood neutrophils/100 leukocytes            76 %            42-75   
     

 

 Automated blood lymphocytes/100 leukocytes            14 %            12-44   
     

 

 Blood monocytes/100 leukocytes            9 %            0-12        

 

 Automated blood eosinophils/100 leukocytes            1 %            0-10     
   

 

 Automated blood basophils/100 leukocytes            0 %            0-10        

 

 Blood neutrophils automated count (number/volume)            9.7 10*3         
   1.8-7.8        

 

 Blood lymphocytes automated count (number/volume)            1.8 10*3         
   1.0-4.0        

 

 Blood monocytes automated count (number/volume)            1.2 10*3            
0.0-1.0        

 

 Automated eosinophil count            0.1 10*3/uL            0.0-0.3        

 

 Automated blood basophil count (count/volume)            0.1 10*3/uL          
  0.0-0.1        









 Comprehensive metabolic panel - 17 06:20         









 Serum or plasma sodium measurement (moles/volume)            144 mmol/L       
     135-145        

 

 Serum or plasma potassium measurement (moles/volume)            3.7 mmol/L    
        3.6-5.0        

 

 Serum or plasma chloride measurement (moles/volume)            109 mmol/L     
               

 

 Carbon dioxide            23 mmol/L            21-32        

 

 Serum or plasma anion gap determination (moles/volume)            12 mmol/L   
         5-14        

 

 Serum or plasma urea nitrogen measurement (mass/volume)            14 mg/dL   
         7-18        

 

 Serum or plasma creatinine measurement (mass/volume)            1.05 mg/dL    
        0.60-1.30        

 

 Serum or plasma urea nitrogen/creatinine mass ratio            13             
NRG        

 

 Serum or plasma creatinine measurement with calculation of estimated 
glomerular filtration rate            >             NRG        

 

 Serum or plasma glucose measurement (mass/volume)            97 mg/dL         
           

 

 Serum or plasma calcium measurement (mass/volume)            9.4 mg/dL        
    8.5-10.1        

 

 Serum or plasma total bilirubin measurement (mass/volume)            1.4 mg/dL
            0.1-1.0        

 

 Serum or plasma alkaline phosphatase measurement (enzymatic activity/volume)  
          70 U/L                    

 

 Serum or plasma aspartate aminotransferase measurement (enzymatic activity/
volume)            23 U/L            5-34        

 

 Serum or plasma alanine aminotransferase measurement (enzymatic activity/volume
)            21 U/L            0-55        

 

 Serum or plasma protein measurement (mass/volume)            7.5 g/dL         
   6.4-8.2        

 

 Serum or plasma albumin measurement (mass/volume)            4.7 g/dL         
   3.2-4.5        



                  



Encounters

      





 ACCT No.            Visit Date/Time            Discharge            Status    
        Pt. Type            Provider            Facility            Loc./Unit  
          Complaint        

 

 [id number]            2014 13:18:00            2014 23:59:59          
  CLS            Outpatient            CLEMENTINA ZAVALETA               
                                

 

 [id number]            2014 11:48:00            2014 23:59:59          
  CLS            Outpatient            SHAHANA MONDRAGON                 
                              

 

 [id number]            2014 12:41:00            2014 23:59:59          
  CLS            Outpatient            SHERRY JAIMES                
                               

 

 [id number]            10/03/2013 16:23:00            10/03/2013 23:59:59          
  CLS            Outpatient            SHERRY JAIMES                
                               

 

 [id number]            2013 09:44:00            2013 23:59:59          
  CLS            Outpatient            SHERRY JAIMES                
                               

 

 [id number]            2013 09:44:00            2013 23:59:59          
  CLS            Outpatient            SHERRY JAIMES                
                               

 

 [id number]            2013 13:36:00                                      
Document Registration                                                          
  

 

 [id number]            2013 13:36:00                                      
Document Registration                                                          
  

 

 [id number]            2013 10:49:00                                      
Document Registration                                                          
  

 

 [id number]            2013 13:55:00                                      
Document Registration                                                          
  

 

 [id number]            2017 02:22:00            2017 14:20:00    
        DIS            Outpatient            PEDRO BLACK MD            [facility]            [facility]            ESOPHAGEAL OBSTRUCTION  
      

 

 [id number]            05/10/2016 09:09:00            05/10/2016 23:59:59    
        CLS            Outpatient            NHUNG TONG            
[facility]            SHERYL                     

 

 [id number]            2014 01:37:00            2014 02:49:00    
        DIS            Emergency            ALINA STANTON MD            
[facility]            ER            ALLERGIC REACTION

## 2021-05-09 ENCOUNTER — HOSPITAL ENCOUNTER (OUTPATIENT)
Dept: HOSPITAL 75 - ER | Age: 36
Discharge: HOME | End: 2021-05-09
Attending: SURGERY
Payer: SELF-PAY

## 2021-05-09 VITALS — DIASTOLIC BLOOD PRESSURE: 64 MMHG | SYSTOLIC BLOOD PRESSURE: 127 MMHG

## 2021-05-09 VITALS — SYSTOLIC BLOOD PRESSURE: 116 MMHG | DIASTOLIC BLOOD PRESSURE: 68 MMHG

## 2021-05-09 VITALS — DIASTOLIC BLOOD PRESSURE: 85 MMHG | SYSTOLIC BLOOD PRESSURE: 127 MMHG

## 2021-05-09 VITALS — BODY MASS INDEX: 22.62 KG/M2 | HEIGHT: 76.77 IN | WEIGHT: 189.6 LBS

## 2021-05-09 VITALS — DIASTOLIC BLOOD PRESSURE: 97 MMHG | SYSTOLIC BLOOD PRESSURE: 145 MMHG

## 2021-05-09 VITALS — SYSTOLIC BLOOD PRESSURE: 117 MMHG | DIASTOLIC BLOOD PRESSURE: 64 MMHG

## 2021-05-09 VITALS — SYSTOLIC BLOOD PRESSURE: 124 MMHG | DIASTOLIC BLOOD PRESSURE: 66 MMHG

## 2021-05-09 VITALS — DIASTOLIC BLOOD PRESSURE: 72 MMHG | SYSTOLIC BLOOD PRESSURE: 132 MMHG

## 2021-05-09 VITALS — DIASTOLIC BLOOD PRESSURE: 90 MMHG | SYSTOLIC BLOOD PRESSURE: 128 MMHG

## 2021-05-09 VITALS — DIASTOLIC BLOOD PRESSURE: 69 MMHG | SYSTOLIC BLOOD PRESSURE: 125 MMHG

## 2021-05-09 VITALS — DIASTOLIC BLOOD PRESSURE: 63 MMHG | SYSTOLIC BLOOD PRESSURE: 114 MMHG

## 2021-05-09 VITALS — SYSTOLIC BLOOD PRESSURE: 120 MMHG | DIASTOLIC BLOOD PRESSURE: 65 MMHG

## 2021-05-09 DIAGNOSIS — Z79.899: ICD-10-CM

## 2021-05-09 DIAGNOSIS — Z87.891: ICD-10-CM

## 2021-05-09 DIAGNOSIS — T18.108A: Primary | ICD-10-CM

## 2021-05-09 DIAGNOSIS — Z88.0: ICD-10-CM

## 2021-05-09 DIAGNOSIS — K44.9: ICD-10-CM

## 2021-05-09 DIAGNOSIS — Z79.2: ICD-10-CM

## 2021-05-09 DIAGNOSIS — K22.2: ICD-10-CM

## 2021-05-09 DIAGNOSIS — Z88.1: ICD-10-CM

## 2021-05-09 DIAGNOSIS — Z88.2: ICD-10-CM

## 2021-05-09 DIAGNOSIS — K29.70: ICD-10-CM

## 2021-05-09 NOTE — PROGRESS NOTE-PRE OPERATIVE
Pre-Operative Progress Note


H&P Reviewed


The H&P was reviewed, patient examined and no changes noted.


Date Seen by Provider:  May 9, 2021


Time Seen by Provider:  09:30


Date H&P Reviewed:  May 9, 2021


Time H&P Reviewed:  09:30


Pre-Operative Diagnosis:  esophageal foreign body, GERD











PEDRO BLACK MD                 May 9, 2021 09:43

## 2021-05-09 NOTE — CONSCIOUS SEDATION/ASA
Conscious Sedation Pre-Proced


Time


09:30





ASA Score


2


For ASA 3 and 4: Consider anesthesia and medical clearance. Also, for patients

with a history of failed moderate sedation consider anesthesia.

















Airway 


 


Lungs 


 


Heart 


 


 ASA score


 


 ASA 1: a normal healthy patient


 


 ASA 2:  a patient with a mild systemic disease (mid diabetes, controlled 

hypertension, obesity 


 


 ASA 3:  a patient with a severe systemic disease that limits activity  (angina,

COPD, prior Myocardial infarction)


 


 ASA 4:  a patient with an incapacitating disease that is a constant threat to 

life (CHF, renal failure)


 


 ASA 5:  a moribund patient not expected to survive 24 hrs.  (ruptured aneurysm)


 


 ASA 6:  a declared brain-dead patient whose organs are being harvested.


 


 For emergent operations, add the letter E after the classification











Mallampati Classification


Grade 2





Sedation Plan


Analgesia, Amnesia, Plan communicated to team members, Discussed options with 

patient/fam, Discussed risks with patient/fam


The patient is an appropriate candidate to undergo the planned procedure, 

sedation, and anesthesia.





The patient immediately re-assessed prior to indication.











PEDRO BLACK MD                 May 9, 2021 09:42

## 2021-05-09 NOTE — DISCHARGE INST-SURGICAL
D/C Lap Instructions-SOFIA


Follow Up PRN





Activity as tolerated








High Fiber Diet 25g or more per day





Avoid Alcohol, Caffeine, Spicy Greasy and Acid foods.





Drink 64 fluid oz or more of fluids per day.





Symptoms to Report: Fever over 101 degree F, Nausea/Vomiting 


If any problems/questions: Contact your physician or go to Emergency Room











PEDRO BLACK MD                 May 9, 2021 11:25

## 2021-05-09 NOTE — ED GI
General


Chief Complaint:  Foreign Body


Stated Complaint:  DIFFICULTY SWALLOWING


Nursing Triage Note:  


POSS FOOD BOLUS YESTERDAY, CHICKEN


Sepsis Screen:  No Definite Risk


Source of Information:  Patient


Exam Limitations:  No Limitations





History of Present Illness


Date Seen by Provider:  May 9, 2021


Time Seen by Provider:  08:15


Initial Comments


This 35-year-old gentleman presents to the emergency room with inability to 

swallow food or fluid NSAIDs yesterday morning.  He was eating chicken when he 

believed a food bolus got stuck.  Anything he has tried to consume since then is

regurgitated up.  He has some mild discomfort similar to heartburn.  He reports 

food bolus obstruction about 3 years ago requiring endoscopic removal.





Allergies and Home Medications


Allergies


Coded Allergies:  


     sulfamethoxazole (Verified  Allergy, Intermediate, RASH, 6/22/14)


     trimethoprim (Verified  Allergy, Intermediate, RASH, 6/22/14)


     Penicillins (Verified  Allergy, Unknown, HIVES, 6/22/14)





Home Medications


Cephalexin 500 Mg Tablet, 500 MG PO BID


   Prescribed by: VANGIE SCHWARTZ on 3/25/19 1557


Multivitamin 1 Each Tablet, 1 TAB PO DAILY, (Reported)


Pantoprazole Sodium 40 Mg Tablet.dr, 40 MG PO DAILY


   Prescribed by: BREEZY HERNÁNDEZ on 4/6/17 1403


Permethrin 60 Gm Cream..g., 60 GM TP ONCE


   Apply from the neck down to the toes at night then wear pajamas to bed. Bathe

in the morning. Repeat in 2 weeks. 


   Prescribed by: VANGIE SCHWARTZ on 3/25/19 1507


Sucralfate 1 Gm Tablet, 1 GM PO QID


   Prescribed by: BREEZY HERNÁNDEZ on 4/6/17 1403





Patient Home Medication List


Home Medication List Reviewed:  Yes





Review of Systems


Review of Systems


Constitutional:  no symptoms reported


EENTM:  No Symptoms Reported


Respiratory:  No Symptoms Reported


Cardiovascular:  No Symptoms Reported


Gastrointestinal:  See HPI


Genitourinary:  No Symptoms Reported


Musculoskeletal:  no symptoms reported


Skin:  no symptoms reported


Psychiatric/Neurological:  No Symptoms Reported


Endocrine:  No Symptoms Reported





Past Medical-Social-Family Hx


Past Med/Social Hx:  Reviewed Nursing Past Med/Soc Hx


Patient Social History


Alcohol Use:  Occasionally Uses


Number of Drinks Today:  0


Alcohol Beverage of Choice:  Beer, Cheap Liquor, Vodka


Drug of Choice:  marijuana


Smoking Status:  Former Smoker


Type Used:  Cigarettes


Recent Infectious Disease Expo:  No


Recent Hopitalizations:  No





Immunizations Up To Date


Tetanus Booster (TDap):  Less than 5yrs





Seasonal Allergies


Seasonal Allergies:  No





Past Medical History


Surgeries:  Yes (EGD W ESOPHAGEAL DILIATION)


Orthopedic


Respiratory:  No


Cardiac:  No


Neurological:  No


Genitourinary:  No


Gastrointestinal:  Yes (Esophogeal stricture)


Gastroesophageal Reflux


Musculoskeletal:  No


Endocrine:  No


HEENT:  No


Cancer:  No


Psychosocial:  No


Integumentary:  No


Blood Disorders:  No


Adverse Reaction/Blood Tranf:  No





Physical Exam


Vital Signs





Vital Signs - First Documented








 5/9/21





 08:05


 


Temp 36.1


 


Pulse 103


 


Resp 20


 


B/P (MAP) 137/100 (112)


 


Pulse Ox 95





Capillary Refill : Less Than 3 Seconds


Height/Weight/BMI


Height: 5'10.00"


Weight: 140lbs. 5.0oz. 63.625934qv; 22.00 BMI


Method:Estimated


General Appearance:  WD/WN, no apparent distress


HEENT:  normal ENT inspection


Neck:  normal inspection


Respiratory:  lungs clear, normal breath sounds, no respiratory distress


Cardiovascular:  regular rate, rhythm, no edema, no murmur


Gastrointestinal:  normal bowel sounds, non tender, soft


Extremities:  normal inspection


Neurologic/Psychiatric:  CNs II-XII nml as tested, no motor/sensory deficits, 

alert, normal mood/affect, oriented x 3


Skin:  normal color, warm/dry





Progress/Results/Core Measures


Results/Orders


My Orders





Orders - ALINA SATNTON MD


Glucagon Emergency Kit (Glucagon Emergen (5/9/21 08:30)


Famotidine Injection (Pepcid Injection) (5/9/21 08:30)


Ed Iv/Invasive Line Start (5/9/21 08:20)


Lactated Ringers (Lr 1000 Ml Iv Solution (5/9/21 08:30)





Medications Given in ED





Current Medications








 Medications  Dose


 Ordered  Sig/Vicki


 Route  Start Time


 Stop Time Status Last Admin


Dose Admin


 


 Famotidine  20 mg  ONCE  ONCE


 IVP  5/9/21 08:30


 5/9/21 08:31 DC 5/9/21 08:33


20 MG


 


 Glucagon  1 mg  ONCE  ONCE


 IV  5/9/21 08:30


 5/9/21 08:31 DC 5/9/21 08:32


1 MG


 


 Lactated Ringer's  1,000 ml @ 


 0 mls/hr  Q0M ONCE


 IV  5/9/21 08:30


 5/9/21 08:31 DC 5/9/21 08:32


1,000 MLS/HR








Vital Signs/I&O











 5/9/21





 08:05


 


Temp 36.1


 


Pulse 103


 


Resp 20


 


B/P (MAP) 137/100 (112)


 


Pulse Ox 95














Blood Pressure Mean:                    112











Progress


Progress Note :  


   Progress Note


Patient was treated with Pepcid and glucagon.  This did not result in 

alleviation of the obstruction.  Dr. Layton was consulted and took him for 

endoscopic removal of the bolus.  Dilatation was also performed during the 

procedure.  He received a liter of IV fluid.  He was discharged home after 

recovery.





Departure


Impression





   Primary Impression:  


   Esophageal obstruction


   Additional Impression:  


   Foreign body in esophagus


   Qualified Codes:  T18.108A - Unspecified foreign body in esophagus causing 

   other injury, initial encounter


Disposition:  01 HOME, SELF-CARE


Condition:  Improved (ERASED)





Departure-Patient Inst.


Referrals:  


OZZY CHAUHAN MD (PCP/Family)


Primary Care Physician





Copy


Copies To 1:   OZZY CHAUHAN MD


Copies To 2:   PEDRO LAYTON MD, JOSHUA T MD         May 9, 2021 09:05

## 2021-05-09 NOTE — HISTORY AND PHYSICAL
DATE OF SERVICE:  



DATE OF ADMISSOIN:

05/09/2021.



ATTENDING PRIMARY CARE PHYSICIAN:

Dr. Steen.



HISTORY OF PRESENT ILLNESS:

The patient is a 35-year-old male who states that he was eating chicken

yesterday and then did feel a substernal pressure sensation and then eventual

dysphagia that persisted overnight.  He states that he has had this before and

has had esophageal foreign body removed in the past as well as a dilatation.  He

does have a history of gastroesophageal reflux disease and does have risk

factors including drinking alcohol as well as a smoking.



PAST MEDICAL HISTORY:

Gastroesophageal reflux disease, esophageal stricture.



PAST SURGICAL HISTORY:

None.



ALLERGIES:

BACTRIM, PENICILLIN.



MEDICATIONS:

Cephalexin 500 mg b.i.d., Protonix 40 mg daily.



SOCIAL HISTORY:

Positive for alcohol and smoking.



VITAL SIGNS:

Temperature 36.1, blood pressure 137/100, pulse 100, respirations 20, pulse ox

95% on room air.



REVIEW OF SYSTEMS:

A well-nourished male currently in no acute distress.  He is not experiencing

any shortness of breath or difficulty breathing.  He does have a substernal

pressure sensation as well as inability to swallow.  No hematemesis, no coffee

ground emesis.  No change in bowel habits.  No fever, chills, no recent

inadvertent weight loss.



PHYSICAL EXAMINATION:

CHEST:  Clear.  Good breath sounds bilaterally.

HEART:  Regular, no murmurs.

EXTREMITIES:  No lower extremity edema, negative Homans sign.

HEENT:  No scleral icterus.

NECK:  No cervical lymphadenopathy.

ABDOMEN:  Soft, nondistended.  There is mild discomfort in the epigastric

region.  No hernias.

SKIN:  Warm, dry.



ASSESSMENT AND PLAN:

A 35-year-old male with history of gastroesophageal reflux disease and

esophageal stricture with symptomatic esophageal foreign body.  We will proceed

with the EGD as well as removal of foreign body as well as possible balloon

dilatation.





Job ID: [id number]

DocumentID: [id number]

Dictated Date:  05/09/2021 09:41:48

Transcription Date: 05/09/2021 10:32:12

Dictated By: PEDRO BLACK MD

## 2021-05-09 NOTE — OPERATIVE REPORT
DATE OF SERVICE:  05/09/2021



ATTENDING PRIMARY CARE PHYSICIAN:

Dr. Steen.



PREOPERATIVE DIAGNOSES:

Esophageal foreign body, gastroesophageal reflux disease, history of esophageal

stricture.



POSTOPERATIVE DIAGNOSES:

Esophageal foreign body, reflux esophagitis stage III, distal esophageal

stricture, small to moderate size hiatal hernia approximately 2.5 cm in size,

mild gastritis.  No distal obstructions.



PROCEDURES:

EGD, removal of esophageal foreign body and balloon dilatation.



SURGEON:

Pedro Black MD.



ANESTHESIA:

Conscious sedation.



ESTIMATED BLOOD LOSS:

Minimal.



FINDINGS:

Esophageal foreign body, reflux esophagitis stage III, distal esophageal

stricture, small to moderate size hiatal hernia approximately 2.5 cm in size,

mild gastritis.  No distal obstructions.



DISPOSITION:

The patient tolerated the procedure well.



INDICATIONS:

The patient is a 35-year-old male who states he was eating chicken yesterday and

felt a substernal pressure sensation and eventual dysphagia that persisted

overnight.  He has had this before and had an EGD as well as esophageal foreign

body removal as well as dilatation in the past.  He does have a history of

gastroesophageal reflux disease and does have risk factors including drinking

alcohol as well as smoking.



DESCRIPTION OF PROCEDURE:

The patient was brought to the endoscopy suite, laid in the left lateral

decubitus position with head flat.  After adequate IV pain and sedative

medications and conscious sedation anesthesia, the mouthpiece was applied.



The endoscope was placed in the mouth, visualizing the pharynx and

hypopharyngeal region.  Vocal cords, epiglottis and vallecula identified and

appeared to be normal.  The endoscope was then gently intubated.  Esophageal

opening and esophagus insufflated.  The endoscope was then advanced through the

first, second and third portions of esophagus at the level of the GE junction,

an esophageal foreign body, likely consistent with a food particulate matter was

identified.  This appeared soft and pliable and this was a gentle pressure using

the endoscope was used to reduce the foreign body into the stomach under direct

visualization.  Good hemostasis was achieved as well as no mucosal tears.  A

reflux esophagitis stage III identified as well as a distal esophageal

stricture.



The endoscope was then advanced in the stomach.  The endoscope retroflexed,

visualizing a small to moderate size hiatal hernia approximately 2.5 cm in size.

 There was a mild gastritis.  The pylorus and duodenum appeared normal with no

distal obstructions.



We then proceeded with balloon dilatation of the distal esophageal stricture and

the balloon was placed in the stomach and pulled back to the area of the

stricture.  We then proceeded in a gradual stepwise fashion from 2, 4 and then

eventually to 6 atmospheres of pressure or 20 mm in luminal diameter.  This was

left in place for 60 seconds.  The balloon was then desufflated and removed with

visualization of good hemostasis as well as no mucosal tears.  The endoscope was

then slowly withdrawn while taking a second look and suctioning of residual air

with no additional findings.



The patient tolerated the procedure well.  We will recommend the necessary

lifestyle and diet accommodation including cessation from drinking and smoking

as well as avoidance of caffeinated beverages, spicy, greasy and acidic foods. 

He also needs to take in small and more frequent meals and avoid eating at

night.  He is currently on Protonix and we will have him continue with that

medication regimen.





Job ID: [id number]

DocumentID: [id number]

Dictated Date:  05/09/2021 11:31:44

Transcription Date: 05/09/2021 15:38:02

Dictated By: PEDRO BLACK MD

## 2021-05-09 NOTE — PROGRESS NOTE-POST OPERATIVE
Post-Operative Progess Note


Surgeon (s)/Assistant (s)


Surgeon


PEDRO BLACK MD


Assistant:  none





Pre-Operative Diagnosis


esophageal foreign body, GERD





Post-Operative Diagnosis





esophageal FB, reflux esophagitis(stage 3), distal esophageal stricture,


moderate HH(2.5cm), mild gastritis.





Procedure & Operative Findings


Date of Procedure


5/9/21


Procedure Performed/Findings


EGD with removal FB and ballon dilatation.


Anesthesia Type


cs





Estimated Blood Loss


Estimated blood loss (mL):  minimal





Specimens/Packing


Specimens Removed


none











PEDRO BLACK MD                 May 9, 2021 11:24